# Patient Record
Sex: FEMALE | Race: WHITE | NOT HISPANIC OR LATINO | URBAN - METROPOLITAN AREA
[De-identification: names, ages, dates, MRNs, and addresses within clinical notes are randomized per-mention and may not be internally consistent; named-entity substitution may affect disease eponyms.]

---

## 2017-08-11 ENCOUNTER — OFFICE VISIT (OUTPATIENT)
Dept: CARDIOLOGY | Facility: HOSPITAL | Age: 52
End: 2017-08-11

## 2017-08-11 ENCOUNTER — PROCEDURE VISIT (OUTPATIENT)
Dept: CARDIOLOGY | Facility: HOSPITAL | Age: 52
End: 2017-08-11

## 2017-08-11 VITALS
WEIGHT: 140.6 LBS | BODY MASS INDEX: 24.01 KG/M2 | TEMPERATURE: 98.1 F | HEIGHT: 64 IN | DIASTOLIC BLOOD PRESSURE: 93 MMHG | OXYGEN SATURATION: 97 % | SYSTOLIC BLOOD PRESSURE: 125 MMHG | HEART RATE: 67 BPM | RESPIRATION RATE: 12 BRPM

## 2017-08-11 DIAGNOSIS — R06.09 DOE (DYSPNEA ON EXERTION): ICD-10-CM

## 2017-08-11 DIAGNOSIS — R07.9 CHEST PAIN, UNSPECIFIED: Primary | ICD-10-CM

## 2017-08-11 DIAGNOSIS — J43.2 CENTRILOBULAR EMPHYSEMA (HCC): ICD-10-CM

## 2017-08-11 DIAGNOSIS — I47.1 PSVT (PAROXYSMAL SUPRAVENTRICULAR TACHYCARDIA) (HCC): ICD-10-CM

## 2017-08-11 DIAGNOSIS — R07.9 CHEST PAIN, UNSPECIFIED: ICD-10-CM

## 2017-08-11 DIAGNOSIS — R00.2 PALPITATIONS: ICD-10-CM

## 2017-08-11 PROCEDURE — 93005 ELECTROCARDIOGRAM TRACING: CPT

## 2017-08-11 PROCEDURE — 99214 OFFICE O/P EST MOD 30 MIN: CPT | Performed by: NURSE PRACTITIONER

## 2017-08-11 RX ORDER — ESTRADIOL 2 MG/1
2 TABLET ORAL DAILY
Refills: 1 | COMMUNITY
Start: 2017-07-25

## 2017-08-11 RX ORDER — ERGOCALCIFEROL 1.25 MG/1
50000 CAPSULE ORAL 2 TIMES WEEKLY
Refills: 0 | COMMUNITY
Start: 2017-07-25

## 2017-08-11 RX ORDER — TIOTROPIUM BROMIDE INHALATION SPRAY 3.12 UG/1
1 SPRAY, METERED RESPIRATORY (INHALATION) DAILY
Refills: 0 | COMMUNITY
Start: 2017-06-16 | End: 2022-11-10

## 2017-08-11 RX ORDER — LEVOTHYROXINE SODIUM 0.12 MG/1
125 TABLET ORAL DAILY
Refills: 0 | COMMUNITY
Start: 2017-08-05

## 2017-08-11 RX ORDER — METHOCARBAMOL 500 MG/1
500 TABLET, FILM COATED ORAL AS NEEDED
Refills: 0 | COMMUNITY
Start: 2017-06-10

## 2017-08-11 RX ORDER — BUPRENORPHINE 10 UG/H
1 PATCH, EXTENDED RELEASE TRANSDERMAL WEEKLY
Refills: 0 | COMMUNITY
Start: 2017-07-21 | End: 2022-11-10

## 2017-08-11 RX ORDER — LANOLIN ALCOHOL/MO/W.PET/CERES
1000 CREAM (GRAM) TOPICAL DAILY
COMMUNITY
End: 2017-08-28

## 2017-08-11 RX ORDER — BISOPROLOL FUMARATE 5 MG/1
5 TABLET, FILM COATED ORAL DAILY
Qty: 30 TABLET | Refills: 1 | Status: SHIPPED | OUTPATIENT
Start: 2017-08-11 | End: 2017-08-28

## 2017-08-11 RX ORDER — CITALOPRAM 40 MG/1
40 TABLET ORAL DAILY
Refills: 0 | COMMUNITY
Start: 2017-08-05

## 2017-08-11 RX ORDER — OMEPRAZOLE 20 MG/1
20 CAPSULE, DELAYED RELEASE ORAL DAILY
Refills: 0 | COMMUNITY
Start: 2017-08-05 | End: 2022-11-10

## 2017-08-11 RX ORDER — ASPIRIN 81 MG/1
81 TABLET ORAL DAILY
COMMUNITY
End: 2022-11-10

## 2017-08-11 RX ORDER — ONDANSETRON 4 MG/1
4 TABLET, FILM COATED ORAL EVERY 6 HOURS PRN
Refills: 0 | COMMUNITY
Start: 2017-07-25 | End: 2022-11-10 | Stop reason: SDUPTHER

## 2017-08-11 RX ORDER — COLCHICINE 0.6 MG/1
0.6 CAPSULE ORAL DAILY
Refills: 2 | COMMUNITY
Start: 2017-07-07

## 2017-08-11 RX ORDER — TIZANIDINE 4 MG/1
2 TABLET ORAL AS NEEDED
Refills: 0 | COMMUNITY
Start: 2017-07-28

## 2017-08-11 NOTE — PROGRESS NOTES
Subjective:     Encounter Date:08/11/2017      Patient ID: Erlinda Steven is a 52 y.o. female.    Chief Complaint:  History of Present Illness: Mrs. Steven [facility] today at the request of PCP, Terrie WHITTINGTON.  She has a history of MI 2015 (cath report obtained from [place]).      Patient reports she began to have fast heart rate and chest pain again on 8/3/17.  She presented to the ER in [place], [place].  SVT was noted per EKG (see copy).  Converted back to NSR with Adenosine.  SVT occurred again on 8/5/17 and 8/8/17.  All episodes occurred while sleeping and symptoms awakened her.  Also, she has hx of pericarditis 2016, but current symptoms do not feel the same. She takes daily colchicine 0.6 mg for recurrent pericarditis x 3 episodes.  Finally, patient adds that she tolerates any higher doses of beta blocker poorly (severe fatigue).  She also has followed with Pulmonology in the past regarding pulmonary hypertension and COPD and PRINCE presently is more limiting than it was 6-12 months ago.     Past Medical History:   Diagnosis Date   • MI, old 2015    Cath @ [facility]/ non-obstructive CAD.     • Morbid obesity 2013    S/P gastric bypass   • Pericarditis 2016   • PSVT (paroxysmal supraventricular tachycardia) 08/2017       Past Surgical History:   Procedure Laterality Date   • BREAST LUMPECTOMY  2010   • CARDIAC CATHETERIZATION  2015   • CYSTECTOMY  1984    From left wrist   • GASTRIC BYPASS  2013   • HYSTERECTOMY  2010       Social History     Social History   • Marital status:      Spouse name: N/A   • Number of children: N/A   • Years of education: N/A     Occupational History   • Not on file.     Social History Main Topics   • Smoking status: Former Smoker     Packs/day: 1.00     Years: 30.00     Quit date: 8/11/2009   • Smokeless tobacco: Never Used   • Alcohol use Yes      Comment: occasional   • Drug use: No   • Sexual activity: Not on file     Other  Topics Concern   • Not on file     Social History Narrative    Patient consumes 2 serving of caffeine daily.     Patient lives at home with .            Family History   Problem Relation Age of Onset   • Heart attack Mother    • Hypertension Father        Review of Systems   Constitution: Positive for weakness and malaise/fatigue. Negative for chills, decreased appetite, diaphoresis, fever, night sweats, weight gain and weight loss.   HENT: Negative for congestion, headaches, hearing loss, hoarse voice and nosebleeds.    Eyes: Negative for blurred vision, visual disturbance and visual halos.   Cardiovascular: Positive for chest pain, claudication, dyspnea on exertion, irregular heartbeat and palpitations. Negative for cyanosis, leg swelling, near-syncope, orthopnea, paroxysmal nocturnal dyspnea and syncope.   Respiratory: Positive for shortness of breath. Negative for cough, hemoptysis, sleep disturbances due to breathing, snoring, sputum production and wheezing.    Hematologic/Lymphatic: Negative for bleeding problem. Bruises/bleeds easily.   Skin: Negative for dry skin, itching and rash.   Musculoskeletal: Positive for joint pain and muscle weakness. Negative for arthritis, joint swelling and myalgias.   Gastrointestinal: Negative for bloating, abdominal pain, constipation, diarrhea, flatus, heartburn, hematemesis, hematochezia, melena, nausea and vomiting.   Genitourinary: Negative for dysuria, frequency, hematuria, nocturia and urgency.   Neurological: Positive for excessive daytime sleepiness. Negative for dizziness, light-headedness and loss of balance.   Psychiatric/Behavioral: Negative for depression. The patient has insomnia. The patient is not nervous/anxious.          Objective:     Physical Exam   Constitutional: She is oriented to person, place, and time. She appears well-developed and well-nourished. No distress.   HENT:   Head: Normocephalic.   Eyes: Pupils are equal, round, and reactive to  "light.   Neck: Neck supple. No JVD present.   Cardiovascular: Normal rate, regular rhythm and intact distal pulses.    No murmur heard.  Split S1 S2   Pulmonary/Chest: Effort normal and breath sounds normal. No respiratory distress. She has no wheezes. She has no rales. She exhibits no tenderness.   Abdominal: Soft. Bowel sounds are normal. There is no tenderness.   Musculoskeletal: Normal range of motion. She exhibits no edema.   Neurological: She is alert and oriented to person, place, and time. No cranial nerve deficit.   Skin: Skin is warm and dry. No rash noted.   Psychiatric: She has a normal mood and affect. Her behavior is normal.     Vitals:    08/11/17 1356 08/11/17 1358 08/11/17 1400   BP: 131/82 128/77 125/93   BP Location: Right arm Left arm Left arm   Patient Position: Sitting Sitting Standing   Pulse: 67  67   Resp: 12     Temp: 98.1 °F (36.7 °C)     TempSrc: Temporal Artery      SpO2: 97%     Weight: 140 lb 9.6 oz (63.8 kg)     Height: 64\" (162.6 cm)         Lab Review: Records sent from PCP, [facility], old records from [facility]     Assessment/ Plan:         Diagnosis Plan   1. Chest pain/ worsened PRINCE associated with new onset PSVT.  Hx of MI.   This requires new ischemic evaluation. Patient states she is unable to walk the treadmill due exercise limiting PRINCE/ COPD.      Check echocardiogram to evaluate LV function and evaluate for valvular disease.  Patient states she would like to establish with [facility] rather than re-visit w/ the [facility].  Will arrange clinic visit after above diagnostics. Return to [facility] in one month to evaluate tolerability of medication changes/ new symptoms.     For PSVT, trial of Bisoprolol 5 mg daily as she tolerates metoprolol poorly.         2. Centrilobular emphysema/ COPD Previously followed with Dr. Fernando.  Will request follow up with Dr. Krishna.  Also consider REJI as a cause for PSVT as all events have occurred while " patient sleeping.     3       Hx of recurrent pericarditis Check echocardiogram.     4.      Hx of morbid obesity S/p gastric bypass procedure.  Patient has had follow up with Endocrinology as of 7/21/17 r/t GI malabsorption.

## 2017-08-28 ENCOUNTER — CONSULT (OUTPATIENT)
Dept: CARDIOLOGY | Facility: CLINIC | Age: 52
End: 2017-08-28

## 2017-08-28 VITALS
BODY MASS INDEX: 23.7 KG/M2 | HEIGHT: 64 IN | SYSTOLIC BLOOD PRESSURE: 118 MMHG | HEART RATE: 64 BPM | DIASTOLIC BLOOD PRESSURE: 84 MMHG | WEIGHT: 138.8 LBS

## 2017-08-28 DIAGNOSIS — I31.9 CHRONIC IDIOPATHIC PERICARDITIS, UNSPECIFIED COMPLICATION STATUS: ICD-10-CM

## 2017-08-28 DIAGNOSIS — I47.1 PSVT (PAROXYSMAL SUPRAVENTRICULAR TACHYCARDIA) (HCC): Primary | ICD-10-CM

## 2017-08-28 PROCEDURE — 93000 ELECTROCARDIOGRAM COMPLETE: CPT | Performed by: INTERNAL MEDICINE

## 2017-08-28 PROCEDURE — 99244 OFF/OP CNSLTJ NEW/EST MOD 40: CPT | Performed by: INTERNAL MEDICINE

## 2017-08-28 NOTE — PROGRESS NOTES
Erlinda Steven  1965  [phone]  [phone]    08/28/2017    [facility]    Terrie Pinedo, APRN  [address] [address]    REFERRING DOCTOR : Dr Franck Ko        Patient ID: Erlinda Steven is a 52 y.o. female.    Chief Complaint:   Chief Complaint   Patient presents with   • SVT     Consult   • Chest Pain   • Irregular Heart Beat   • Dizziness     Patient Active Problem List   Diagnosis   • Chest pain, unspecified   • Centrilobular emphysema   • PRINCE (dyspnea on exertion)   • Palpitations   • PSVT (paroxysmal supraventricular tachycardia)   • Arthritis   • Asthma   • Hypothyroid   • Chronic idiopathic pericarditis         Allergies   Allergen Reactions   • Penicillins      Nausea and vomiting   • Sulfa Antibiotics      Nausea and vomiting       Current Outpatient Prescriptions:   •  aspirin 81 MG EC tablet, Take 81 mg by mouth Daily., Disp: , Rfl:   •  BUTRANS 10 MCG/HR patch weekly, Place 1 patch on the skin 1 (One) Time Per Week., Disp: , Rfl: 0  •  citalopram (CeleXA) 40 MG tablet, Take 40 mg by mouth Daily., Disp: , Rfl: 0  •  colchicine 0.6 MG capsule capsule, Take 0.6 mg by mouth Daily., Disp: , Rfl: 2  •  estradiol (ESTRACE) 2 MG tablet, Take 2 mg by mouth Daily., Disp: , Rfl: 1  •  levothyroxine (SYNTHROID, LEVOTHROID) 125 MCG tablet, Take 125 mcg by mouth Daily., Disp: , Rfl: 0  •  methocarbamol (ROBAXIN) 500 MG tablet, Take 500 mg by mouth As Needed., Disp: , Rfl: 0  •  omeprazole (priLOSEC) 20 MG capsule, Take 20 mg by mouth Daily., Disp: , Rfl: 0  •  ondansetron (ZOFRAN) 4 MG tablet, Take 4 mg by mouth Every 6 (Six) Hours As Needed., Disp: , Rfl: 0  •  SPIRIVA RESPIMAT 2.5 MCG/ACT aerosol solution, Inhale 1 puff Daily., Disp: , Rfl: 0  •  tiZANidine (ZANAFLEX) 4 MG tablet, Take 2 mg by mouth Every Night., Disp: , Rfl: 0  •  vitamin D (ERGOCALCIFEROL) 23411 UNITS capsule capsule, Take 50,000 Units by mouth 2 (Two) Times a Week., Disp: ,  Rfl: 0    History of Present Illness  Patient presents today for consultation referred by Franck Ko regarding recurrent SVT.  Has had a history of previous pericarditis in the past currently on colchicine.  She's had at least 3 or 4 episodes of this with no actual etiology found.  She states she has had palpitations sporadic for most of her life however now for the past one to 2 years having them more often.  Seems to be having them now on a daily basis.  She went to the emergency room at the beginning of August due to chest pain and palpitations.  At that time found to be in SVT at 150 bpm.  She states that when she goes into these rhythms she tries to bear down multiple times and this works only occasionally.  Sometimes just with time they go away.  She states that when she has these she gets short of breath palpitations as well as some dizziness at times.     The following portions of the patient's history were reviewed and updated as appropriate: allergies, current medications, past family history, past medical history, past social history, past surgical history and problem list.    Past Medical History:   Diagnosis Date   • Acid reflux    • Arthritis    • Asthma     HX of   • Chest pain     2/2acute pericarditis ( hx of recurrent episode since late teens) diffuse, non specific EKG, no troponin evaluation to suggest myopericarditis has been treated in the past with prednisone but this may encourage recurrence   • Chicken pox    • COPD (chronic obstructive pulmonary disease)    • Hypothyroid    • Measles    • Menopause    • MI, old 2015    Cath @ [facility]/ non-obstructive CAD.     • Morbid obesity 2013    S/P gastric bypass   • Nausea    • Pericarditis 2016   • PSVT (paroxysmal supraventricular tachycardia) 08/2017   • Scarlet fever    • Ulcer          Past Surgical History:   Procedure Laterality Date   • BREAST LUMPECTOMY Bilateral 2010   • CARDIAC CATHETERIZATION  2015   • CHOLECYSTECTOMY     •  CYSTECTOMY  1984    From left wrist   • GASTRIC BYPASS  2013   • HYSTERECTOMY  2010    partial   • OTHER SURGICAL HISTORY      Left ganglion cyst   • OTHER SURGICAL HISTORY      Plate and pin in left radial head   • TUBAL ABDOMINAL LIGATION         Social History     Social History   • Marital status:      Spouse name: N/A   • Number of children: N/A   • Years of education: N/A     Occupational History   • Not on file.     Social History Main Topics   • Smoking status: Former Smoker     Packs/day: 1.00     Years: 30.00     Quit date: 8/11/2009   • Smokeless tobacco: Never Used   • Alcohol use Yes      Comment: occasional   • Drug use: No   • Sexual activity: Defer     Other Topics Concern   • Not on file     Social History Narrative    Patient consumes 2 serving of caffeine daily.     Patient lives at home with .            Family History   Problem Relation Age of Onset   • Heart attack Mother    • Arthritis Mother    • Hypertension Mother    • Obesity Mother    • Migraines Mother    • Hypertension Father    • Diabetes Father    • Stroke Father    • Obesity Father    • Cancer Paternal Grandmother      Colon         REVIEW OF SYSTEMS:   CONSTITUTIONAL: No weight loss, fever, chills  HEENT: Eyes: No visual loss, blurred vision, double vision or yellow sclerae. Ears, Nose, Throat: No hearing loss, sneezing, congestion, runny nose or sore throat.   SKIN: No rash or itching.     RESPIRATORY: No  hemoptysis, cough or sputum.   GASTROINTESTINAL: No anorexia, nausea, vomiting or diarrhea. No abdominal pain, bright red blood per rectum or melena.  GENITOURINARY: No burning on urination, hematuria or increased frequency.  NEUROLOGICAL: No headache, dizziness, syncope, paralysis, ataxia, numbness or tingling in the extremities. No change in bowel or bladder control.   MUSCULOSKELETAL: No muscle, back pain, joint pain or stiffness.   HEMATOLOGIC: No anemia, bleeding or bruising.   LYMPHATICS: No enlarged nodes. No  "history of splenectomy.   PSYCHIATRIC: No history of depression, anxiety, hallucinations.   ENDOCRINOLOGIC: No reports of sweating, cold or heat intolerance. No polyuria or polydipsia.   Ext: No edema or bruising      The patient's old records including ambulatory rhythm recordings (ECGs, Holter/event monitor) were reviewed and discussed.           Objective:       Vitals:    08/28/17 0833   BP: 118/84   BP Location: Left arm   Patient Position: Sitting   Pulse: 64   Weight: 138 lb 12.8 oz (63 kg)   Height: 64\" (162.6 cm)       Constitutional: oriented to person, place, and time.  well-developed and well-nourished. No distress.   HENT: Normocephalic.   Eyes: Conjunctivae are normal. No scleral icterus.   Neck: Normal carotid pulses, no hepatojugular reflux and no JVD present. Carotid bruit is not present. No tracheal deviation, no edema and no erythema present. No thyromegaly present.   Cardiovascular: Normal rate, regular rhythm, S1 normal, S2 normal, normal heart sounds and intact distal pulses.   No extrasystoles are present. PMI is not displaced.  Exam reveals no gallop, no distant heart sounds and no friction rub.    No murmur heard.  Pulses:       Radial pulses are 2+ on the right side, and 2+ on the left side.       Dorsalis pedis pulses are 2+ on the right side, and 2+ on the left side.   Pulmonary/Chest: Effort normal and breath sounds normal. No respiratory distress. She has no decreased breath sounds.  no wheezes,  Rhonchi or rales.  no tenderness.   Abdominal: Soft. Bowel sounds are normal. She exhibits no distension and no mass. There is no hepatosplenomegaly. There is no tenderness. There is no rebound and no guarding.   Musculoskeletal:  exhibits no edema, tenderness or deformity.   Neurological: is alert and oriented to person, place, and time.   Skin: Skin is warm and dry. No rash noted. No diaphoretic. No cyanosis or erythema. No pallor. Nails show no clubbing.   Psychiatric: Normal mood and " affect.Speech is normal and behavior is normal.    Lab Review:   Results for orders placed or performed during the hospital encounter of 09/22/16   Troponin   Result Value Ref Range    Troponin I <0.01 0.00 - 0.05 ng/mL   C-reactive Protein   Result Value Ref Range    C-Reactive Protein <0.5 0.0 - 1.0 mg/dL   Sedimentation Rate   Result Value Ref Range    Sed Rate 2 0 - 20 mm/hr         ECG 12 Lead  Date/Time: 8/28/2017 9:21 AM  Performed by: CARRI SHANKS  Authorized by: CARRI SHANKS   Rhythm: sinus bradycardia  Comments: HR 59 bpm. Short NM with minimal to no preexcitation.                 Diagnosis:   1. PSVT (paroxysmal supraventricular tachycardia)  2. Chronic idiopathic pericarditis, on colchicine      Assessment & Plan:   1. Recurrent symptomatic SVT despite the use of medications.  Now having them on a daily basis.  Seems to be more of a long RP tachycardia.  EKG today showed short NM interval with minimal to no preexcitation noted on EKG.  I think she would be best served with an EP study plus or minus ablation.  She understands all risk and benefits and wished to proceed. For now will only take when necessary metoprolol if has recurrence of SVT and will continue to bear down as needed.  2.  Chronic idiopathic pericarditis maintained on colchicine.  Currently controlled.  3. Follow up after ablation         CC: Dr Franck Shanks, DO  08/28/17  9:22 AM      EMR Dragon/Transcription disclaimer:  Much of this encounter note is an electronic transcription/translation of spoken language to printed text. Electronic translation of spoken language may permit erroneous, or at times, nonsensical words or phrases to be inadvertently transcribed. Although I have reviewed the note for such errors, some may still exist.

## 2017-09-05 ENCOUNTER — PREP FOR SURGERY (OUTPATIENT)
Dept: OTHER | Facility: HOSPITAL | Age: 52
End: 2017-09-05

## 2017-09-05 DIAGNOSIS — I47.1 SVT (SUPRAVENTRICULAR TACHYCARDIA) (HCC): Primary | ICD-10-CM

## 2017-09-05 RX ORDER — ACETAMINOPHEN 325 MG/1
650 TABLET ORAL EVERY 4 HOURS PRN
Status: CANCELLED | OUTPATIENT
Start: 2017-09-05

## 2017-09-05 RX ORDER — ONDANSETRON 2 MG/ML
4 INJECTION INTRAMUSCULAR; INTRAVENOUS EVERY 6 HOURS PRN
Status: CANCELLED | OUTPATIENT
Start: 2017-09-05

## 2017-09-05 RX ORDER — SODIUM CHLORIDE 0.9 % (FLUSH) 0.9 %
1-10 SYRINGE (ML) INJECTION AS NEEDED
Status: CANCELLED | OUTPATIENT
Start: 2017-09-05

## 2017-09-06 ENCOUNTER — PREP FOR SURGERY (OUTPATIENT)
Dept: OTHER | Facility: HOSPITAL | Age: 52
End: 2017-09-06

## 2017-09-06 ENCOUNTER — APPOINTMENT (OUTPATIENT)
Dept: PREADMISSION TESTING | Facility: HOSPITAL | Age: 52
End: 2017-09-06

## 2017-09-06 DIAGNOSIS — I47.1 SVT (SUPRAVENTRICULAR TACHYCARDIA) (HCC): ICD-10-CM

## 2017-09-06 LAB
ALBUMIN SERPL-MCNC: 4.3 G/DL (ref 3.2–4.8)
ALBUMIN/GLOB SERPL: 1.8 G/DL (ref 1.5–2.5)
ALP SERPL-CCNC: 100 U/L (ref 25–100)
ALT SERPL W P-5'-P-CCNC: 24 U/L (ref 7–40)
ANION GAP SERPL CALCULATED.3IONS-SCNC: 5 MMOL/L (ref 3–11)
AST SERPL-CCNC: 28 U/L (ref 0–33)
BILIRUB SERPL-MCNC: 0.2 MG/DL (ref 0.3–1.2)
BUN BLD-MCNC: 8 MG/DL (ref 9–23)
BUN/CREAT SERPL: 13.3 (ref 7–25)
CALCIUM SPEC-SCNC: 9 MG/DL (ref 8.7–10.4)
CHLORIDE SERPL-SCNC: 105 MMOL/L (ref 99–109)
CO2 SERPL-SCNC: 27 MMOL/L (ref 20–31)
CREAT BLD-MCNC: 0.6 MG/DL (ref 0.6–1.3)
DEPRECATED RDW RBC AUTO: 40.4 FL (ref 37–54)
ERYTHROCYTE [DISTWIDTH] IN BLOOD BY AUTOMATED COUNT: 14.4 % (ref 11.3–14.5)
GFR SERPL CREATININE-BSD FRML MDRD: 105 ML/MIN/1.73
GLOBULIN UR ELPH-MCNC: 2.4 GM/DL
GLUCOSE BLD-MCNC: 103 MG/DL (ref 70–100)
HCT VFR BLD AUTO: 34.8 % (ref 34.5–44)
HGB BLD-MCNC: 10.6 G/DL (ref 11.5–15.5)
INR PPP: 0.99
MAGNESIUM SERPL-MCNC: 2 MG/DL (ref 1.3–2.7)
MCH RBC QN AUTO: 23.2 PG (ref 27–31)
MCHC RBC AUTO-ENTMCNC: 30.5 G/DL (ref 32–36)
MCV RBC AUTO: 76.1 FL (ref 80–99)
PLATELET # BLD AUTO: 192 10*3/MM3 (ref 150–450)
PMV BLD AUTO: 9.9 FL (ref 6–12)
POTASSIUM BLD-SCNC: 3.7 MMOL/L (ref 3.5–5.5)
PROT SERPL-MCNC: 6.7 G/DL (ref 5.7–8.2)
PROTHROMBIN TIME: 10.8 SECONDS (ref 9.6–11.5)
RBC # BLD AUTO: 4.57 10*6/MM3 (ref 3.89–5.14)
SODIUM BLD-SCNC: 137 MMOL/L (ref 132–146)
WBC NRBC COR # BLD: 4.31 10*3/MM3 (ref 3.5–10.8)

## 2017-09-07 ENCOUNTER — HOSPITAL ENCOUNTER (OUTPATIENT)
Facility: HOSPITAL | Age: 52
Setting detail: OBSERVATION
Discharge: HOME | End: 2017-09-08
Attending: INTERNAL MEDICINE | Admitting: INTERNAL MEDICINE

## 2017-09-07 PROBLEM — I47.1 SVT (SUPRAVENTRICULAR TACHYCARDIA): Status: ACTIVE | Noted: 2017-09-07

## 2017-09-07 PROCEDURE — C1894 INTRO/SHEATH, NON-LASER: HCPCS | Performed by: INTERNAL MEDICINE

## 2017-09-07 PROCEDURE — 99152 MOD SED SAME PHYS/QHP 5/>YRS: CPT | Performed by: INTERNAL MEDICINE

## 2017-09-07 PROCEDURE — 25010000002 FENTANYL CITRATE (PF) 100 MCG/2ML SOLUTION: Performed by: INTERNAL MEDICINE

## 2017-09-07 PROCEDURE — 93613 INTRACARDIAC EPHYS 3D MAPG: CPT | Performed by: INTERNAL MEDICINE

## 2017-09-07 PROCEDURE — 93653 COMPRE EP EVAL TX SVT: CPT | Performed by: INTERNAL MEDICINE

## 2017-09-07 PROCEDURE — C1730 CATH, EP, 19 OR FEW ELECT: HCPCS | Performed by: INTERNAL MEDICINE

## 2017-09-07 PROCEDURE — G0378 HOSPITAL OBSERVATION PER HR: HCPCS

## 2017-09-07 PROCEDURE — 93623 PRGRMD STIMJ&PACG IV RX NFS: CPT | Performed by: INTERNAL MEDICINE

## 2017-09-07 PROCEDURE — C1732 CATH, EP, DIAG/ABL, 3D/VECT: HCPCS | Performed by: INTERNAL MEDICINE

## 2017-09-07 PROCEDURE — 25010000002 MIDAZOLAM PER 1 MG: Performed by: INTERNAL MEDICINE

## 2017-09-07 PROCEDURE — 25010000002 ONDANSETRON PER 1 MG: Performed by: INTERNAL MEDICINE

## 2017-09-07 RX ORDER — LEVOTHYROXINE SODIUM 0.12 MG/1
125 TABLET ORAL
Status: DISCONTINUED | OUTPATIENT
Start: 2017-09-07 | End: 2017-09-08 | Stop reason: HOSPADM

## 2017-09-07 RX ORDER — ASPIRIN 81 MG/1
81 TABLET ORAL DAILY
Status: DISCONTINUED | OUTPATIENT
Start: 2017-09-07 | End: 2017-09-08 | Stop reason: HOSPADM

## 2017-09-07 RX ORDER — FENTANYL CITRATE 50 UG/ML
INJECTION, SOLUTION INTRAMUSCULAR; INTRAVENOUS AS NEEDED
Status: DISCONTINUED | OUTPATIENT
Start: 2017-09-07 | End: 2017-09-07 | Stop reason: HOSPADM

## 2017-09-07 RX ORDER — CITALOPRAM 40 MG/1
40 TABLET ORAL DAILY
Status: DISCONTINUED | OUTPATIENT
Start: 2017-09-07 | End: 2017-09-08 | Stop reason: HOSPADM

## 2017-09-07 RX ORDER — OXYCODONE HYDROCHLORIDE AND ACETAMINOPHEN 5; 325 MG/1; MG/1
1 TABLET ORAL EVERY 4 HOURS PRN
Status: DISCONTINUED | OUTPATIENT
Start: 2017-09-07 | End: 2017-09-08 | Stop reason: HOSPADM

## 2017-09-07 RX ORDER — MIDAZOLAM HYDROCHLORIDE 1 MG/ML
INJECTION INTRAMUSCULAR; INTRAVENOUS AS NEEDED
Status: DISCONTINUED | OUTPATIENT
Start: 2017-09-07 | End: 2017-09-07 | Stop reason: HOSPADM

## 2017-09-07 RX ORDER — ONDANSETRON 2 MG/ML
4 INJECTION INTRAMUSCULAR; INTRAVENOUS EVERY 6 HOURS PRN
Status: DISCONTINUED | OUTPATIENT
Start: 2017-09-07 | End: 2017-09-08 | Stop reason: HOSPADM

## 2017-09-07 RX ORDER — TIZANIDINE 4 MG/1
2 TABLET ORAL NIGHTLY
Status: DISCONTINUED | OUTPATIENT
Start: 2017-09-07 | End: 2017-09-08 | Stop reason: HOSPADM

## 2017-09-07 RX ORDER — ONDANSETRON 2 MG/ML
INJECTION INTRAMUSCULAR; INTRAVENOUS AS NEEDED
Status: DISCONTINUED | OUTPATIENT
Start: 2017-09-07 | End: 2017-09-07 | Stop reason: HOSPADM

## 2017-09-07 RX ORDER — COLCHICINE 0.6 MG/1
0.6 TABLET ORAL NIGHTLY
Status: DISCONTINUED | OUTPATIENT
Start: 2017-09-07 | End: 2017-09-08 | Stop reason: HOSPADM

## 2017-09-07 RX ORDER — PANTOPRAZOLE SODIUM 40 MG/1
40 TABLET, DELAYED RELEASE ORAL EVERY MORNING
Status: DISCONTINUED | OUTPATIENT
Start: 2017-09-07 | End: 2017-09-08 | Stop reason: HOSPADM

## 2017-09-07 RX ORDER — ONDANSETRON 2 MG/ML
4 INJECTION INTRAMUSCULAR; INTRAVENOUS EVERY 6 HOURS PRN
Status: DISCONTINUED | OUTPATIENT
Start: 2017-09-07 | End: 2017-09-07 | Stop reason: HOSPADM

## 2017-09-07 RX ORDER — SODIUM CHLORIDE 0.9 % (FLUSH) 0.9 %
1-10 SYRINGE (ML) INJECTION AS NEEDED
Status: DISCONTINUED | OUTPATIENT
Start: 2017-09-07 | End: 2017-09-07 | Stop reason: HOSPADM

## 2017-09-07 RX ORDER — ACETAMINOPHEN 325 MG/1
650 TABLET ORAL EVERY 4 HOURS PRN
Status: DISCONTINUED | OUTPATIENT
Start: 2017-09-07 | End: 2017-09-07 | Stop reason: HOSPADM

## 2017-09-07 RX ORDER — ALPRAZOLAM 0.5 MG/1
0.5 TABLET ORAL ONCE
Status: COMPLETED | OUTPATIENT
Start: 2017-09-07 | End: 2017-09-07

## 2017-09-07 RX ADMIN — PANTOPRAZOLE SODIUM 40 MG: 40 TABLET, DELAYED RELEASE ORAL at 16:52

## 2017-09-07 RX ADMIN — ALPRAZOLAM 0.5 MG: 0.5 TABLET ORAL at 16:52

## 2017-09-07 RX ADMIN — ASPIRIN 81 MG: 81 TABLET, COATED ORAL at 16:52

## 2017-09-07 RX ADMIN — COLCHICINE 0.6 MG: 0.6 TABLET, FILM COATED ORAL at 21:56

## 2017-09-07 RX ADMIN — TIZANIDINE 2 MG: 4 TABLET ORAL at 21:56

## 2017-09-07 NOTE — H&P (VIEW-ONLY)
Erlinda Steven  1965  [phone]  [phone]    08/28/2017    [facility] CARDIOLOGY    Terrie Pinedo, APRN  [address] [facility] [address]    REFERRING DOCTOR : Dr Franck Ko        Patient ID: Erlinda Steven is a 52 y.o. female.    Chief Complaint:   Chief Complaint   Patient presents with   • SVT     Consult   • Chest Pain   • Irregular Heart Beat   • Dizziness     Patient Active Problem List   Diagnosis   • Chest pain, unspecified   • Centrilobular emphysema   • PRINCE (dyspnea on exertion)   • Palpitations   • PSVT (paroxysmal supraventricular tachycardia)   • Arthritis   • Asthma   • Hypothyroid   • Chronic idiopathic pericarditis         Allergies   Allergen Reactions   • Penicillins      Nausea and vomiting   • Sulfa Antibiotics      Nausea and vomiting       Current Outpatient Prescriptions:   •  aspirin 81 MG EC tablet, Take 81 mg by mouth Daily., Disp: , Rfl:   •  BUTRANS 10 MCG/HR patch weekly, Place 1 patch on the skin 1 (One) Time Per Week., Disp: , Rfl: 0  •  citalopram (CeleXA) 40 MG tablet, Take 40 mg by mouth Daily., Disp: , Rfl: 0  •  colchicine 0.6 MG capsule capsule, Take 0.6 mg by mouth Daily., Disp: , Rfl: 2  •  estradiol (ESTRACE) 2 MG tablet, Take 2 mg by mouth Daily., Disp: , Rfl: 1  •  levothyroxine (SYNTHROID, LEVOTHROID) 125 MCG tablet, Take 125 mcg by mouth Daily., Disp: , Rfl: 0  •  methocarbamol (ROBAXIN) 500 MG tablet, Take 500 mg by mouth As Needed., Disp: , Rfl: 0  •  omeprazole (priLOSEC) 20 MG capsule, Take 20 mg by mouth Daily., Disp: , Rfl: 0  •  ondansetron (ZOFRAN) 4 MG tablet, Take 4 mg by mouth Every 6 (Six) Hours As Needed., Disp: , Rfl: 0  •  SPIRIVA RESPIMAT 2.5 MCG/ACT aerosol solution, Inhale 1 puff Daily., Disp: , Rfl: 0  •  tiZANidine (ZANAFLEX) 4 MG tablet, Take 2 mg by mouth Every Night., Disp: , Rfl: 0  •  vitamin D (ERGOCALCIFEROL) 68045 UNITS capsule capsule, Take 50,000 Units by mouth 2 (Two) Times a Week., Disp: ,  Rfl: 0    History of Present Illness  Patient presents today for consultation referred by Franck Ko regarding recurrent SVT.  Has had a history of previous pericarditis in the past currently on colchicine.  She's had at least 3 or 4 episodes of this with no actual etiology found.  She states she has had palpitations sporadic for most of her life however now for the past one to 2 years having them more often.  Seems to be having them now on a daily basis.  She went to the emergency room at the beginning of August due to chest pain and palpitations.  At that time found to be in SVT at 150 bpm.  She states that when she goes into these rhythms she tries to bear down multiple times and this works only occasionally.  Sometimes just with time they go away.  She states that when she has these she gets short of breath palpitations as well as some dizziness at times.     The following portions of the patient's history were reviewed and updated as appropriate: allergies, current medications, past family history, past medical history, past social history, past surgical history and problem list.    Past Medical History:   Diagnosis Date   • Acid reflux    • Arthritis    • Asthma     HX of   • Chest pain     2/2acute pericarditis ( hx of recurrent episode since late teens) diffuse, non specific EKG, no troponin evaluation to suggest myopericarditis has been treated in the past with prednisone but this may encourage recurrence   • Chicken pox    • COPD (chronic obstructive pulmonary disease)    • Hypothyroid    • Measles    • Menopause    • MI, old 2015    Cath @ [facility]/ non-obstructive CAD.     • Morbid obesity 2013    S/P gastric bypass   • Nausea    • Pericarditis 2016   • PSVT (paroxysmal supraventricular tachycardia) 08/2017   • Scarlet fever    • Ulcer          Past Surgical History:   Procedure Laterality Date   • BREAST LUMPECTOMY Bilateral 2010   • CARDIAC CATHETERIZATION  2015   • CHOLECYSTECTOMY     •  CYSTECTOMY  1984    From left wrist   • GASTRIC BYPASS  2013   • HYSTERECTOMY  2010    partial   • OTHER SURGICAL HISTORY      Left ganglion cyst   • OTHER SURGICAL HISTORY      Plate and pin in left radial head   • TUBAL ABDOMINAL LIGATION         Social History     Social History   • Marital status:      Spouse name: N/A   • Number of children: N/A   • Years of education: N/A     Occupational History   • Not on file.     Social History Main Topics   • Smoking status: Former Smoker     Packs/day: 1.00     Years: 30.00     Quit date: 8/11/2009   • Smokeless tobacco: Never Used   • Alcohol use Yes      Comment: occasional   • Drug use: No   • Sexual activity: Defer     Other Topics Concern   • Not on file     Social History Narrative    Patient consumes 2 serving of caffeine daily.     Patient lives at home with .            Family History   Problem Relation Age of Onset   • Heart attack Mother    • Arthritis Mother    • Hypertension Mother    • Obesity Mother    • Migraines Mother    • Hypertension Father    • Diabetes Father    • Stroke Father    • Obesity Father    • Cancer Paternal Grandmother      Colon         REVIEW OF SYSTEMS:   CONSTITUTIONAL: No weight loss, fever, chills  HEENT: Eyes: No visual loss, blurred vision, double vision or yellow sclerae. Ears, Nose, Throat: No hearing loss, sneezing, congestion, runny nose or sore throat.   SKIN: No rash or itching.     RESPIRATORY: No  hemoptysis, cough or sputum.   GASTROINTESTINAL: No anorexia, nausea, vomiting or diarrhea. No abdominal pain, bright red blood per rectum or melena.  GENITOURINARY: No burning on urination, hematuria or increased frequency.  NEUROLOGICAL: No headache, dizziness, syncope, paralysis, ataxia, numbness or tingling in the extremities. No change in bowel or bladder control.   MUSCULOSKELETAL: No muscle, back pain, joint pain or stiffness.   HEMATOLOGIC: No anemia, bleeding or bruising.   LYMPHATICS: No enlarged nodes. No  "history of splenectomy.   PSYCHIATRIC: No history of depression, anxiety, hallucinations.   ENDOCRINOLOGIC: No reports of sweating, cold or heat intolerance. No polyuria or polydipsia.   Ext: No edema or bruising      The patient's old records including ambulatory rhythm recordings (ECGs, Holter/event monitor) were reviewed and discussed.           Objective:       Vitals:    08/28/17 0833   BP: 118/84   BP Location: Left arm   Patient Position: Sitting   Pulse: 64   Weight: 138 lb 12.8 oz (63 kg)   Height: 64\" (162.6 cm)       Constitutional: oriented to person, place, and time.  well-developed and well-nourished. No distress.   HENT: Normocephalic.   Eyes: Conjunctivae are normal. No scleral icterus.   Neck: Normal carotid pulses, no hepatojugular reflux and no JVD present. Carotid bruit is not present. No tracheal deviation, no edema and no erythema present. No thyromegaly present.   Cardiovascular: Normal rate, regular rhythm, S1 normal, S2 normal, normal heart sounds and intact distal pulses.   No extrasystoles are present. PMI is not displaced.  Exam reveals no gallop, no distant heart sounds and no friction rub.    No murmur heard.  Pulses:       Radial pulses are 2+ on the right side, and 2+ on the left side.       Dorsalis pedis pulses are 2+ on the right side, and 2+ on the left side.   Pulmonary/Chest: Effort normal and breath sounds normal. No respiratory distress. She has no decreased breath sounds.  no wheezes,  Rhonchi or rales.  no tenderness.   Abdominal: Soft. Bowel sounds are normal. She exhibits no distension and no mass. There is no hepatosplenomegaly. There is no tenderness. There is no rebound and no guarding.   Musculoskeletal:  exhibits no edema, tenderness or deformity.   Neurological: is alert and oriented to person, place, and time.   Skin: Skin is warm and dry. No rash noted. No diaphoretic. No cyanosis or erythema. No pallor. Nails show no clubbing.   Psychiatric: Normal mood and " affect.Speech is normal and behavior is normal.    Lab Review:   Results for orders placed or performed during the hospital encounter of 09/22/16   Troponin   Result Value Ref Range    Troponin I <0.01 0.00 - 0.05 ng/mL   C-reactive Protein   Result Value Ref Range    C-Reactive Protein <0.5 0.0 - 1.0 mg/dL   Sedimentation Rate   Result Value Ref Range    Sed Rate 2 0 - 20 mm/hr         ECG 12 Lead  Date/Time: 8/28/2017 9:21 AM  Performed by: CARRI SHANKS  Authorized by: CARRI SHANKS   Rhythm: sinus bradycardia  Comments: HR 59 bpm. Short MA with minimal to no preexcitation.                 Diagnosis:   1. PSVT (paroxysmal supraventricular tachycardia)  2. Chronic idiopathic pericarditis, on colchicine      Assessment & Plan:   1. Recurrent symptomatic SVT despite the use of medications.  Now having them on a daily basis.  Seems to be more of a long RP tachycardia.  EKG today showed short MA interval with minimal to no preexcitation noted on EKG.  I think she would be best served with an EP study plus or minus ablation.  She understands all risk and benefits and wished to proceed. For now will only take when necessary metoprolol if has recurrence of SVT and will continue to bear down as needed.  2.  Chronic idiopathic pericarditis maintained on colchicine.  Currently controlled.  3. Follow up after ablation         CC: Dr Franck Shanks, DO  08/28/17  9:22 AM      EMR Dragon/Transcription disclaimer:  Much of this encounter note is an electronic transcription/translation of spoken language to printed text. Electronic translation of spoken language may permit erroneous, or at times, nonsensical words or phrases to be inadvertently transcribed. Although I have reviewed the note for such errors, some may still exist.

## 2017-09-07 NOTE — INTERVAL H&P NOTE
"  H&P reviewed. The patient was examined and there are no changes to the H&P.    Vitals:    09/07/17 1020   BP: 140/74   BP Location: Left arm   Patient Position: Lying   Pulse: 71   Resp: 16   Temp: 97.5 °F (36.4 °C)   TempSrc: Temporal Artery    SpO2: 97%   Weight: 136 lb 3.9 oz (61.8 kg)   Height: 64\" (162.6 cm)     Results Review:     I reviewed the patient's new clinical results.      Results from last 7 days  Lab Units 09/06/17  1625   WBC 10*3/mm3 4.31   HEMOGLOBIN g/dL 10.6*   HEMATOCRIT % 34.8   PLATELETS 10*3/mm3 192       Results from last 7 days  Lab Units 09/06/17  1625   SODIUM mmol/L 137   POTASSIUM mmol/L 3.7   CHLORIDE mmol/L 105   CO2 mmol/L 27.0   BUN mg/dL 8*   CREATININE mg/dL 0.60   CALCIUM mg/dL 9.0   BILIRUBIN mg/dL 0.2*   ALK PHOS U/L 100   ALT (SGPT) U/L 24   AST (SGOT) U/L 28   GLUCOSE mg/dL 103*       Results from last 7 days  Lab Units 09/06/17  1625   SODIUM mmol/L 137   POTASSIUM mmol/L 3.7   CHLORIDE mmol/L 105   CO2 mmol/L 27.0   BUN mg/dL 8*   CREATININE mg/dL 0.60   GLUCOSE mg/dL 103*   CALCIUM mg/dL 9.0       Results from last 7 days  Lab Units 09/06/17  1625   INR  0.99     No results found for: TROPONINT                A/P     1. Symptomatic recurrent SVT   - EPS +/- RFA today. Risks, benefits, and alternatives have been discussed and patient wishes to proceed.     STAFF:  52 year old female with history of recurrent SVT despite medical therapy. Here for EP study +/- RFA. The risks, benefits, and alternatives of the procedure have been reviewed and the patient wishes to proceed.     Javier Orozco DO  5:33 PM  09/07/17    "

## 2017-09-08 VITALS
TEMPERATURE: 98.3 F | DIASTOLIC BLOOD PRESSURE: 63 MMHG | OXYGEN SATURATION: 91 % | WEIGHT: 136.24 LBS | RESPIRATION RATE: 12 BRPM | SYSTOLIC BLOOD PRESSURE: 98 MMHG | BODY MASS INDEX: 23.26 KG/M2 | HEART RATE: 69 BPM | HEIGHT: 64 IN

## 2017-09-08 PROCEDURE — 93005 ELECTROCARDIOGRAM TRACING: CPT | Performed by: INTERNAL MEDICINE

## 2017-09-08 PROCEDURE — G0378 HOSPITAL OBSERVATION PER HR: HCPCS

## 2017-09-08 PROCEDURE — 99217 PR OBSERVATION CARE DISCHARGE MANAGEMENT: CPT | Performed by: PHYSICIAN ASSISTANT

## 2017-09-08 PROCEDURE — 93010 ELECTROCARDIOGRAM REPORT: CPT | Performed by: INTERNAL MEDICINE

## 2017-09-08 RX ADMIN — PANTOPRAZOLE SODIUM 40 MG: 40 TABLET, DELAYED RELEASE ORAL at 06:39

## 2017-09-08 RX ADMIN — LEVOTHYROXINE SODIUM 125 MCG: 125 TABLET ORAL at 06:39

## 2017-09-08 RX ADMIN — OXYCODONE AND ACETAMINOPHEN 1 TABLET: 5; 325 TABLET ORAL at 03:30

## 2017-09-11 ENCOUNTER — TELEPHONE (OUTPATIENT)
Dept: CARDIOLOGY | Facility: CLINIC | Age: 52
End: 2017-09-11

## 2017-09-11 NOTE — TELEPHONE ENCOUNTER
Patient called to let us know that her heart has been racing for the last couple of days. I explained to her that it is normal after the ablation. She is going to keep a log of her BP and HR over the next week and let us know if it continues to get worse.

## 2017-09-12 ENCOUNTER — APPOINTMENT (OUTPATIENT)
Dept: CARDIOLOGY | Facility: HOSPITAL | Age: 52
End: 2017-09-12

## 2017-10-03 ENCOUNTER — TELEPHONE (OUTPATIENT)
Dept: CARDIOLOGY | Facility: CLINIC | Age: 52
End: 2017-10-03

## 2017-10-03 NOTE — TELEPHONE ENCOUNTER
Start metoprolol 12.5 mg BID (short acting)   Keep a log of HRs. May need a monitor if not improved but with HRs in the 80s unlikely to be recurrent SVt      Ernesto

## 2017-10-03 NOTE — TELEPHONE ENCOUNTER
Patient called to let you know that since her ablation on 9/7/17, her heart has still been racing. I asked her how high her HR has gotten and she said in the 80's. She said that it feels like it is racing and pounding in her chest. So she has been taking Metoprolol 12.5 mg daily when it feels this way. She said that this stops it. Her BP today was 118/70 and HR has been in the 80's. Do you still want her to continue this? She was not discharged home on this.

## 2017-12-11 ENCOUNTER — OFFICE VISIT (OUTPATIENT)
Dept: CARDIOLOGY | Facility: CLINIC | Age: 52
End: 2017-12-11

## 2017-12-11 VITALS
HEIGHT: 64 IN | HEART RATE: 81 BPM | SYSTOLIC BLOOD PRESSURE: 120 MMHG | DIASTOLIC BLOOD PRESSURE: 74 MMHG | BODY MASS INDEX: 24.24 KG/M2 | WEIGHT: 142 LBS

## 2017-12-11 DIAGNOSIS — I31.9 PERICARDITIS, UNSPECIFIED CHRONICITY, UNSPECIFIED TYPE: ICD-10-CM

## 2017-12-11 DIAGNOSIS — I47.1 PSVT (PAROXYSMAL SUPRAVENTRICULAR TACHYCARDIA) (HCC): Primary | ICD-10-CM

## 2017-12-11 DIAGNOSIS — R00.2 PALPITATIONS: ICD-10-CM

## 2017-12-11 PROCEDURE — 99213 OFFICE O/P EST LOW 20 MIN: CPT | Performed by: INTERNAL MEDICINE

## 2017-12-11 NOTE — PROGRESS NOTES
Subjective:   Erlinda Steven  1965  [phone]  [phone]    12/11/2017    [facility] CARDIOLOGY    Terrie Pinedo, APRN  [address] [facility] [address]    REFERRING DOCTOR: Franck Ko      Patient ID: Erlinda Steven is a 52 y.o. female.    Chief Complaint:   Chief Complaint   Patient presents with   • Chest Pain   • Rapid Heart Rate       Allergies   Allergen Reactions   • Penicillins      Nausea and vomiting   • Sulfa Antibiotics      Nausea and vomiting       Current Outpatient Prescriptions:   •  aspirin 81 MG EC tablet, Take 81 mg by mouth Daily., Disp: , Rfl:   •  BUTRANS 10 MCG/HR patch weekly, Place 1 patch on the skin 1 (One) Time Per Week., Disp: , Rfl: 0  •  citalopram (CeleXA) 40 MG tablet, Take 40 mg by mouth Daily., Disp: , Rfl: 0  •  colchicine 0.6 MG capsule capsule, Take 0.6 mg by mouth Daily., Disp: , Rfl: 2  •  Cyanocobalamin (VITAMIN B-12 IJ), Inject  as directed 1 (One) Time Per Week., Disp: , Rfl:   •  estradiol (ESTRACE) 2 MG tablet, Take 2 mg by mouth Daily., Disp: , Rfl: 1  •  levothyroxine (SYNTHROID, LEVOTHROID) 125 MCG tablet, Take 125 mcg by mouth Daily., Disp: , Rfl: 0  •  methocarbamol (ROBAXIN) 500 MG tablet, Take 500 mg by mouth As Needed for Muscle Spasms., Disp: , Rfl: 0  •  omeprazole (priLOSEC) 20 MG capsule, Take 20 mg by mouth Daily., Disp: , Rfl: 0  •  ondansetron (ZOFRAN) 4 MG tablet, Take 4 mg by mouth Every 6 (Six) Hours As Needed for Nausea or Vomiting., Disp: , Rfl: 0  •  SPIRIVA RESPIMAT 2.5 MCG/ACT aerosol solution, Inhale 1 puff Daily., Disp: , Rfl: 0  •  tiZANidine (ZANAFLEX) 4 MG tablet, Take 2 mg by mouth Every Night., Disp: , Rfl: 0  •  vitamin D (ERGOCALCIFEROL) 57102 UNITS capsule capsule, Take 50,000 Units by mouth 2 (Two) Times a Week., Disp: , Rfl: 0    History of Present Illness  Patient is a 52-year-old female with a history of previous pericarditis in the past and recurrent SVT episodes despite the use of  "medications.  Now status post AVNRT ablation here for follow-up visit. Initially post had palpitations different then prior but everyday and bothersome. Now for the past 4 weeks on no meds no recurrences and feeling great.    No issues with chest pain, shortness of breath, fevers, chills, night sweats, PND, orthopnea. No recent ER visits or hospital stays.      The following portions of the patient's history were reviewed and updated as appropriate: allergies, current medications, past family history, past medical history, past social history, past surgical history and problem list.    ROS   14 point ROS negative except as outlined in problem list, HPI and other parts of the note.    Procedures       Objective:       Vitals:    12/11/17 1112   BP: 120/74   BP Location: Right arm   Patient Position: Sitting   Pulse: 81   Weight: 64.4 kg (142 lb)   Height: 162.6 cm (64\")       GENERAL: Well-developed, well-nourished patient in no acute distress.  HEENT: Normocephalic, atraumatic, PERRLA. Moist mucous membranes.  NECK: No JVD present at 30°. No carotid bruits auscultated.  LUNGS: Clear to auscultation.  CARDIOVASCULAR: Heart has a regular rate and rhythm. No murmurs, gallops or rubs noted.   ABDOMEN: Soft, nontender. Positive bowel sounds.  MUSCULOSKELETAL: No gross deformities. No clubbing, cyanosis, or lower extremity edema.  SKIN: Pink, warm  Neuro: Nonfocal exam. Gait intact  Ext: No edema or bruising    The patient's old records including ambulatory rhythm recordings (ECGs, Holter/event monitor) were reviewed and discussed.      Lab Review:   Results for orders placed or performed in visit on 09/06/17   CBC (No diff)   Result Value Ref Range    WBC 4.31 3.50 - 10.80 10*3/mm3    RBC 4.57 3.89 - 5.14 10*6/mm3    Hemoglobin 10.6 (L) 11.5 - 15.5 g/dL    Hematocrit 34.8 34.5 - 44.0 %    MCV 76.1 (L) 80.0 - 99.0 fL    MCH 23.2 (L) 27.0 - 31.0 pg    MCHC 30.5 (L) 32.0 - 36.0 g/dL    RDW 14.4 11.3 - 14.5 %    RDW-SD 40.4 " 37.0 - 54.0 fl    MPV 9.9 6.0 - 12.0 fL    Platelets 192 150 - 450 10*3/mm3   Comprehensive metabolic panel   Result Value Ref Range    Glucose 103 (H) 70 - 100 mg/dL    BUN 8 (L) 9 - 23 mg/dL    Creatinine 0.60 0.60 - 1.30 mg/dL    Sodium 137 132 - 146 mmol/L    Potassium 3.7 3.5 - 5.5 mmol/L    Chloride 105 99 - 109 mmol/L    CO2 27.0 20.0 - 31.0 mmol/L    Calcium 9.0 8.7 - 10.4 mg/dL    Total Protein 6.7 5.7 - 8.2 g/dL    Albumin 4.30 3.20 - 4.80 g/dL    ALT (SGPT) 24 7 - 40 U/L    AST (SGOT) 28 0 - 33 U/L    Alkaline Phosphatase 100 25 - 100 U/L    Total Bilirubin 0.2 (L) 0.3 - 1.2 mg/dL    eGFR Non African Amer 105 >60 mL/min/1.73    Globulin 2.4 gm/dL    A/G Ratio 1.8 1.5 - 2.5 g/dL    BUN/Creatinine Ratio 13.3 7.0 - 25.0    Anion Gap 5.0 3.0 - 11.0 mmol/L   Protime-INR   Result Value Ref Range    Protime 10.8 9.6 - 11.5 Seconds    INR 0.99    Magnesium   Result Value Ref Range    Magnesium 2.0 1.3 - 2.7 mg/dL           Diagnosis:   1. PSVT (paroxysmal supraventricular tachycardia)  2. Palpitations  3. Chronic Idiopathic Pericarditis    Assessment & Plan:   1. SVT s/p AVNRT ablation in August 2017 with no recurrences of palpitations for the past 4 weeks on no meds. Initially post procedure had palpitations everyday but different then before procedure but now resolved. If recurrent consider a 30 day event monitor  2. History of Pericarditis on Colchicine at this time.   3. Follow up with me in 6 mths and if no issues then make prn         CC: Franck Orozco,   12/11/17  11:20 AM      EMR Dragon/Transcription disclaimer:  Much of this encounter note is an electronic transcription/translation of spoken language to printed text. Electronic translation of spoken language may permit erroneous, or at times, nonsensical words or phrases to be inadvertently transcribed. Although I have reviewed the note for such errors, some may still exist.

## 2018-01-19 ENCOUNTER — TELEPHONE (OUTPATIENT)
Dept: CARDIOLOGY | Facility: CLINIC | Age: 53
End: 2018-01-19

## 2018-07-09 ENCOUNTER — OFFICE VISIT (OUTPATIENT)
Dept: CARDIOLOGY | Facility: CLINIC | Age: 53
End: 2018-07-09

## 2018-07-09 VITALS
BODY MASS INDEX: 25.03 KG/M2 | HEART RATE: 71 BPM | WEIGHT: 146.6 LBS | DIASTOLIC BLOOD PRESSURE: 80 MMHG | HEIGHT: 64 IN | SYSTOLIC BLOOD PRESSURE: 120 MMHG

## 2018-07-09 DIAGNOSIS — I31.1 OTHER CHRONIC CONSTRICTIVE PERICARDITIS: ICD-10-CM

## 2018-07-09 DIAGNOSIS — I47.1 SVT (SUPRAVENTRICULAR TACHYCARDIA) (HCC): Primary | ICD-10-CM

## 2018-07-09 PROCEDURE — 99213 OFFICE O/P EST LOW 20 MIN: CPT | Performed by: INTERNAL MEDICINE

## 2018-07-09 RX ORDER — PYRAZINAMIDE 500 MG/1
1 TABLET ORAL AS NEEDED
COMMUNITY
End: 2022-11-10

## 2018-07-09 NOTE — PROGRESS NOTES
Subjective:   Erlinda Steven  1965  [phone]  [phone]    12/11/2017    [facility] CARDIOLOGY    Shannan De La Cruz, APRN  [address] [address]    REFERRING DOCTOR: Franck Ko      Patient ID: Erlinda Steven is a 53 y.o. female.    Chief Complaint:   Chief Complaint   Patient presents with   • PSVT   • Zari-Parkinson-White Syndrome       Allergies   Allergen Reactions   • Penicillins      Nausea and vomiting   • Requip [Ropinirole Hcl] Nausea And Vomiting   • Sulfa Antibiotics      Nausea and vomiting       Current Outpatient Prescriptions:   •  acetaminophen-codeine (TYLENOL/CODEINE #3) 300-30 MG per tablet, Take 1 tablet by mouth As Needed for Moderate Pain ., Disp: , Rfl:   •  aspirin 81 MG EC tablet, Take 81 mg by mouth Daily., Disp: , Rfl:   •  BUTRANS 10 MCG/HR patch weekly, Place 1 patch on the skin 1 (One) Time Per Week., Disp: , Rfl: 0  •  citalopram (CeleXA) 40 MG tablet, Take 40 mg by mouth Daily., Disp: , Rfl: 0  •  colchicine 0.6 MG capsule capsule, Take 0.6 mg by mouth Daily., Disp: , Rfl: 2  •  Cyanocobalamin (VITAMIN B-12 IJ), Inject  as directed 1 (One) Time Per Week., Disp: , Rfl:   •  estradiol (ESTRACE) 2 MG tablet, Take 2 mg by mouth Daily., Disp: , Rfl: 1  •  levothyroxine (SYNTHROID, LEVOTHROID) 125 MCG tablet, Take 125 mcg by mouth Daily., Disp: , Rfl: 0  •  methocarbamol (ROBAXIN) 500 MG tablet, Take 500 mg by mouth As Needed for Muscle Spasms., Disp: , Rfl: 0  •  omeprazole (priLOSEC) 20 MG capsule, Take 20 mg by mouth Daily., Disp: , Rfl: 0  •  ondansetron (ZOFRAN) 4 MG tablet, Take 4 mg by mouth Every 6 (Six) Hours As Needed for Nausea or Vomiting., Disp: , Rfl: 0  •  SPIRIVA RESPIMAT 2.5 MCG/ACT aerosol solution, Inhale 1 puff Daily., Disp: , Rfl: 0  •  tiZANidine (ZANAFLEX) 4 MG tablet, Take 2 mg by mouth As Needed., Disp: , Rfl: 0  •  vitamin D (ERGOCALCIFEROL) 67875 UNITS capsule capsule, Take 50,000 Units by mouth 2 (Two)  "Times a Week., Disp: , Rfl: 0    History of Present Illness  Patient is a 53-year-old female with a history of previous pericarditis in the past and recurrent SVT episodes despite the use of medications.  Now status post AVNRT ablation here for follow-up visit. Initially post had palpitations different then prior to ablation and did well until one month ago. Again, having palpitations short lived and different than before ablation but has had up to 6 times since the ablation. Feels like \"flip flop\" then beats fast for seconds then goes away.   No issues with chest pain, shortness of breath, fevers, chills, night sweats, PND, orthopnea. No recent ER visits or hospital stays.      The following portions of the patient's history were reviewed and updated as appropriate: allergies, current medications, past family history, past medical history, past social history, past surgical history and problem list.    ROS   14 point ROS negative except as outlined in problem list, HPI and other parts of the note.    Procedures       Objective:       Vitals:    07/09/18 1123   BP: 120/80   BP Location: Right arm   Patient Position: Sitting   Pulse: 71   Weight: 66.5 kg (146 lb 9.6 oz)   Height: 162.6 cm (64\")       GENERAL: Well-developed, well-nourished patient in no acute distress.  HEENT: Normocephalic, atraumatic, PERRLA. Moist mucous membranes.  NECK: No JVD present at 30°. No carotid bruits auscultated.  LUNGS: Clear to auscultation.  CARDIOVASCULAR: Heart has a regular rate and rhythm. No murmurs, gallops or rubs noted.   ABDOMEN: Soft, nontender. Positive bowel sounds.  MUSCULOSKELETAL: No gross deformities. No clubbing, cyanosis, or lower extremity edema.  SKIN: Pink, warm  Neuro: Nonfocal exam. Gait intact  Ext: No edema or bruising    The patient's old records including ambulatory rhythm recordings (ECGs, Holter/event monitor) were reviewed and discussed.      Lab Review:   Results for orders placed or performed in visit " on 09/06/17   CBC (No diff)   Result Value Ref Range    WBC 4.31 3.50 - 10.80 10*3/mm3    RBC 4.57 3.89 - 5.14 10*6/mm3    Hemoglobin 10.6 (L) 11.5 - 15.5 g/dL    Hematocrit 34.8 34.5 - 44.0 %    MCV 76.1 (L) 80.0 - 99.0 fL    MCH 23.2 (L) 27.0 - 31.0 pg    MCHC 30.5 (L) 32.0 - 36.0 g/dL    RDW 14.4 11.3 - 14.5 %    RDW-SD 40.4 37.0 - 54.0 fl    MPV 9.9 6.0 - 12.0 fL    Platelets 192 150 - 450 10*3/mm3   Comprehensive metabolic panel   Result Value Ref Range    Glucose 103 (H) 70 - 100 mg/dL    BUN 8 (L) 9 - 23 mg/dL    Creatinine 0.60 0.60 - 1.30 mg/dL    Sodium 137 132 - 146 mmol/L    Potassium 3.7 3.5 - 5.5 mmol/L    Chloride 105 99 - 109 mmol/L    CO2 27.0 20.0 - 31.0 mmol/L    Calcium 9.0 8.7 - 10.4 mg/dL    Total Protein 6.7 5.7 - 8.2 g/dL    Albumin 4.30 3.20 - 4.80 g/dL    ALT (SGPT) 24 7 - 40 U/L    AST (SGOT) 28 0 - 33 U/L    Alkaline Phosphatase 100 25 - 100 U/L    Total Bilirubin 0.2 (L) 0.3 - 1.2 mg/dL    eGFR Non African Amer 105 >60 mL/min/1.73    Globulin 2.4 gm/dL    A/G Ratio 1.8 1.5 - 2.5 g/dL    BUN/Creatinine Ratio 13.3 7.0 - 25.0    Anion Gap 5.0 3.0 - 11.0 mmol/L   Protime-INR   Result Value Ref Range    Protime 10.8 9.6 - 11.5 Seconds    INR 0.99    Magnesium   Result Value Ref Range    Magnesium 2.0 1.3 - 2.7 mg/dL           Diagnosis:   1. PSVT (paroxysmal supraventricular tachycardia)  2. Palpitations  3. Chronic Idiopathic Pericarditis    Assessment & Plan:   1. SVT s/p AVNRT ablation in August 2017 with no recurrences of palpitations for the past 4 weeks on no meds. Initially post procedure had palpitations everyday then resolved but now back , short lived but still with bothersome palpitations In the past month a total of 6 times. Lasts for about 30 seconds. After discussion with patient and her long term goal is to not take too medications will get a 30 day event monitor to help with best course of action. Consider medical Rx (BB, Flec etc) vs redo EP study +/- RFA depending on event  results.   2. History of Pericarditis on Colchicine at this time.   3. Follow up with me in 3-4 mths         CC: Franck Orozco DO  07/09/18  11:43 AM      EMR Dragon/Transcription disclaimer:  Much of this encounter note is an electronic transcription/translation of spoken language to printed text. Electronic translation of spoken language may permit erroneous, or at times, nonsensical words or phrases to be inadvertently transcribed. Although I have reviewed the note for such errors, some may still exist.

## 2020-08-25 ENCOUNTER — LAB (OUTPATIENT)
Dept: LAB | Facility: HOSPITAL | Age: 55
End: 2020-08-25

## 2020-08-25 ENCOUNTER — TRANSCRIBE ORDERS (OUTPATIENT)
Dept: LAB | Facility: HOSPITAL | Age: 55
End: 2020-08-25

## 2020-08-25 DIAGNOSIS — R50.9 FEVER, UNSPECIFIED: ICD-10-CM

## 2020-08-25 DIAGNOSIS — R50.9 FEVER, UNSPECIFIED: Primary | ICD-10-CM

## 2020-08-25 PROCEDURE — C9803 HOPD COVID-19 SPEC COLLECT: HCPCS

## 2020-08-25 PROCEDURE — U0002 COVID-19 LAB TEST NON-CDC: HCPCS

## 2020-08-25 PROCEDURE — U0004 COV-19 TEST NON-CDC HGH THRU: HCPCS

## 2020-08-26 LAB
REF LAB TEST METHOD: NORMAL
SARS-COV-2 RNA RESP QL NAA+PROBE: NOT DETECTED

## 2020-08-27 ENCOUNTER — HOSPITAL ENCOUNTER (EMERGENCY)
Facility: HOSPITAL | Age: 55
Discharge: HOME | End: 2020-08-27
Attending: EMERGENCY MEDICINE | Admitting: EMERGENCY MEDICINE

## 2020-08-27 ENCOUNTER — APPOINTMENT (OUTPATIENT)
Dept: CT IMAGING | Facility: HOSPITAL | Age: 55
End: 2020-08-27

## 2020-08-27 VITALS
DIASTOLIC BLOOD PRESSURE: 78 MMHG | HEIGHT: 64 IN | HEART RATE: 91 BPM | RESPIRATION RATE: 18 BRPM | SYSTOLIC BLOOD PRESSURE: 127 MMHG | OXYGEN SATURATION: 95 % | BODY MASS INDEX: 23.9 KG/M2 | WEIGHT: 140 LBS | TEMPERATURE: 98.8 F

## 2020-08-27 DIAGNOSIS — E87.6 HYPOKALEMIA: ICD-10-CM

## 2020-08-27 DIAGNOSIS — K52.9 COLITIS: Primary | ICD-10-CM

## 2020-08-27 LAB
ALBUMIN SERPL-MCNC: 3.8 G/DL (ref 3.5–5.2)
ALBUMIN/GLOB SERPL: 1.5 G/DL
ALP SERPL-CCNC: 78 U/L (ref 39–117)
ALT SERPL W P-5'-P-CCNC: 8 U/L (ref 1–33)
ANION GAP SERPL CALCULATED.3IONS-SCNC: 13.2 MMOL/L (ref 5–15)
AST SERPL-CCNC: 13 U/L (ref 1–32)
BACTERIA UR QL AUTO: ABNORMAL /HPF
BASOPHILS # BLD AUTO: 0.08 10*3/MM3 (ref 0–0.2)
BASOPHILS NFR BLD AUTO: 0.5 % (ref 0–1.5)
BILIRUB SERPL-MCNC: 0.2 MG/DL (ref 0–1.2)
BILIRUB UR QL STRIP: NEGATIVE
BUN SERPL-MCNC: 3 MG/DL (ref 6–20)
BUN/CREAT SERPL: 5 (ref 7–25)
C DIFF GDH STL QL: NEGATIVE
CALCIUM SPEC-SCNC: 8.3 MG/DL (ref 8.6–10.5)
CHLORIDE SERPL-SCNC: 100 MMOL/L (ref 98–107)
CLARITY UR: CLEAR
CO2 SERPL-SCNC: 24.8 MMOL/L (ref 22–29)
COLOR UR: YELLOW
CREAT SERPL-MCNC: 0.6 MG/DL (ref 0.57–1)
DEPRECATED RDW RBC AUTO: 41.9 FL (ref 37–54)
EOSINOPHIL # BLD AUTO: 0.08 10*3/MM3 (ref 0–0.4)
EOSINOPHIL NFR BLD AUTO: 0.5 % (ref 0.3–6.2)
ERYTHROCYTE [DISTWIDTH] IN BLOOD BY AUTOMATED COUNT: 13.6 % (ref 12.3–15.4)
GFR SERPL CREATININE-BSD FRML MDRD: 104 ML/MIN/1.73
GLOBULIN UR ELPH-MCNC: 2.5 GM/DL
GLUCOSE SERPL-MCNC: 108 MG/DL (ref 65–99)
GLUCOSE UR STRIP-MCNC: NEGATIVE MG/DL
HCT VFR BLD AUTO: 35.6 % (ref 34–46.6)
HGB BLD-MCNC: 11.9 G/DL (ref 12–15.9)
HGB UR QL STRIP.AUTO: NEGATIVE
HOLD SPECIMEN: NORMAL
HOLD SPECIMEN: NORMAL
HYALINE CASTS UR QL AUTO: ABNORMAL /LPF
IMM GRANULOCYTES # BLD AUTO: 0.07 10*3/MM3 (ref 0–0.05)
IMM GRANULOCYTES NFR BLD AUTO: 0.5 % (ref 0–0.5)
KETONES UR QL STRIP: ABNORMAL
LEUKOCYTE ESTERASE UR QL STRIP.AUTO: NEGATIVE
LIPASE SERPL-CCNC: 83 U/L (ref 13–60)
LYMPHOCYTES # BLD AUTO: 1.7 10*3/MM3 (ref 0.7–3.1)
LYMPHOCYTES NFR BLD AUTO: 11.7 % (ref 19.6–45.3)
MCH RBC QN AUTO: 28.5 PG (ref 26.6–33)
MCHC RBC AUTO-ENTMCNC: 33.4 G/DL (ref 31.5–35.7)
MCV RBC AUTO: 85.2 FL (ref 79–97)
MONOCYTES # BLD AUTO: 1.05 10*3/MM3 (ref 0.1–0.9)
MONOCYTES NFR BLD AUTO: 7.2 % (ref 5–12)
NEUTROPHILS NFR BLD AUTO: 11.61 10*3/MM3 (ref 1.7–7)
NEUTROPHILS NFR BLD AUTO: 79.6 % (ref 42.7–76)
NITRITE UR QL STRIP: NEGATIVE
NRBC BLD AUTO-RTO: 0 /100 WBC (ref 0–0.2)
PH UR STRIP.AUTO: 5.5 [PH] (ref 5–8)
PLATELET # BLD AUTO: 227 10*3/MM3 (ref 140–450)
PMV BLD AUTO: 8.9 FL (ref 6–12)
POTASSIUM SERPL-SCNC: 3 MMOL/L (ref 3.5–5.2)
PROT SERPL-MCNC: 6.3 G/DL (ref 6–8.5)
PROT UR QL STRIP: ABNORMAL
RBC # BLD AUTO: 4.18 10*6/MM3 (ref 3.77–5.28)
RBC # UR: ABNORMAL /HPF
REF LAB TEST METHOD: ABNORMAL
SODIUM SERPL-SCNC: 138 MMOL/L (ref 136–145)
SP GR UR STRIP: 1.02 (ref 1–1.03)
SQUAMOUS #/AREA URNS HPF: ABNORMAL /HPF
UROBILINOGEN UR QL STRIP: ABNORMAL
WBC # BLD AUTO: 14.59 10*3/MM3 (ref 3.4–10.8)
WBC UR QL AUTO: ABNORMAL /HPF
WHOLE BLOOD HOLD SPECIMEN: NORMAL
WHOLE BLOOD HOLD SPECIMEN: NORMAL

## 2020-08-27 PROCEDURE — 87449 NOS EACH ORGANISM AG IA: CPT | Performed by: PHYSICIAN ASSISTANT

## 2020-08-27 PROCEDURE — 87324 CLOSTRIDIUM AG IA: CPT | Performed by: PHYSICIAN ASSISTANT

## 2020-08-27 PROCEDURE — 99283 EMERGENCY DEPT VISIT LOW MDM: CPT

## 2020-08-27 PROCEDURE — 85025 COMPLETE CBC W/AUTO DIFF WBC: CPT | Performed by: PHYSICIAN ASSISTANT

## 2020-08-27 PROCEDURE — 96360 HYDRATION IV INFUSION INIT: CPT

## 2020-08-27 PROCEDURE — 83690 ASSAY OF LIPASE: CPT | Performed by: PHYSICIAN ASSISTANT

## 2020-08-27 PROCEDURE — 80053 COMPREHEN METABOLIC PANEL: CPT | Performed by: PHYSICIAN ASSISTANT

## 2020-08-27 PROCEDURE — 81001 URINALYSIS AUTO W/SCOPE: CPT | Performed by: PHYSICIAN ASSISTANT

## 2020-08-27 PROCEDURE — 25010000002 IOPAMIDOL 61 % SOLUTION: Performed by: EMERGENCY MEDICINE

## 2020-08-27 PROCEDURE — 74177 CT ABD & PELVIS W/CONTRAST: CPT

## 2020-08-27 RX ORDER — ONDANSETRON 2 MG/ML
4 INJECTION INTRAMUSCULAR; INTRAVENOUS ONCE
Status: DISCONTINUED | OUTPATIENT
Start: 2020-08-27 | End: 2020-08-27 | Stop reason: HOSPADM

## 2020-08-27 RX ORDER — POTASSIUM CHLORIDE 750 MG/1
40 CAPSULE, EXTENDED RELEASE ORAL ONCE
Status: COMPLETED | OUTPATIENT
Start: 2020-08-27 | End: 2020-08-27

## 2020-08-27 RX ORDER — POTASSIUM CHLORIDE 750 MG/1
10 TABLET, FILM COATED, EXTENDED RELEASE ORAL 2 TIMES DAILY
Qty: 10 TABLET | Refills: 0 | Status: SHIPPED | OUTPATIENT
Start: 2020-08-27 | End: 2020-09-01

## 2020-08-27 RX ORDER — LEVOFLOXACIN 500 MG/1
500 TABLET, FILM COATED ORAL DAILY
Qty: 10 TABLET | Refills: 0 | Status: SHIPPED | OUTPATIENT
Start: 2020-08-27 | End: 2020-09-06

## 2020-08-27 RX ORDER — SODIUM CHLORIDE 0.9 % (FLUSH) 0.9 %
10 SYRINGE (ML) INJECTION AS NEEDED
Status: DISCONTINUED | OUTPATIENT
Start: 2020-08-27 | End: 2020-08-27 | Stop reason: HOSPADM

## 2020-08-27 RX ADMIN — IOPAMIDOL 100 ML: 612 INJECTION, SOLUTION INTRAVENOUS at 14:37

## 2020-08-27 RX ADMIN — SODIUM CHLORIDE 1000 ML: 9 INJECTION, SOLUTION INTRAVENOUS at 13:42

## 2020-08-27 RX ADMIN — POTASSIUM CHLORIDE 40 MEQ: 10 CAPSULE, COATED, EXTENDED RELEASE ORAL at 14:20

## 2020-08-27 NOTE — ED PROVIDER NOTES
Subjective   55-year-old female presents with lower abdominal pain, fever, and diarrhea.  She states that every time she eats or drinks he has multiple episodes of diarrhea.  This is been going on for 1 week.  She has had an unintentional 18 pound weight loss.  She is never had symptoms like this in the past.  The diarrhea is loose, and is colored and oranges.  No blood in her stool.      History provided by:  Patient   used: No        Review of Systems   Gastrointestinal: Positive for diarrhea.   Neurological: Positive for weakness.   All other systems reviewed and are negative.      Past Medical History:   Diagnosis Date   • Acid reflux    • Arthritis    • Asthma     HX of   • Back pain    • Chest pain     2/2acute pericarditis ( hx of recurrent episode since late teens) diffuse, non specific EKG, no troponin evaluation to suggest myopericarditis has been treated in the past with prednisone but this may encourage recurrence   • Chicken pox    • COPD (chronic obstructive pulmonary disease) (CMS/[facility])    • Dental bridge present    • Hypothyroid    • Measles    • Menopause    • MI, old 2015    Cath @ [facility]/ non-obstructive CAD.     • Morbid obesity (CMS/[facility]) 2013    S/P gastric bypass   • Nausea    • Pericarditis 2016   • PSVT (paroxysmal supraventricular tachycardia) (CMS/[facility]) 08/2017   • Scarlet fever    • Ulcer    • UTI (urinary tract infection)     2 months ago- denies current s/s   • Wears eyeglasses    • Zari-Parkinson-White (WPW) syndrome        Allergies   Allergen Reactions   • Penicillins      Nausea and vomiting   • Requip [Ropinirole Hcl] Nausea And Vomiting   • Sulfa Antibiotics      Nausea and vomiting       Past Surgical History:   Procedure Laterality Date   • BREAST LUMPECTOMY Bilateral 2010   • CARDIAC CATHETERIZATION  2015   • CARDIAC ELECTROPHYSIOLOGY PROCEDURE N/A 9/7/2017    Procedure: Ablation SVT;  Surgeon: Javier Orozco DO;  Location: [facility] INVASIVE  LOCATION;  Service:    • CHOLECYSTECTOMY     • COLONOSCOPY       4 years ago   • CYSTECTOMY  1984    From left wrist   • GASTRIC BYPASS  2013   • HYSTERECTOMY  2010    partial   • OTHER SURGICAL HISTORY      Left ganglion cyst   • OTHER SURGICAL HISTORY      Plate and pin in left radial head   • TUBAL ABDOMINAL LIGATION         Family History   Problem Relation Age of Onset   • Heart attack Mother    • Arthritis Mother    • Hypertension Mother    • Obesity Mother    • Migraines Mother    • Hypertension Father    • Diabetes Father    • Stroke Father    • Obesity Father    • Cancer Paternal Grandmother         Colon       Social History     Socioeconomic History   • Marital status:      Spouse name: Not on file   • Number of children: Not on file   • Years of education: Not on file   • Highest education level: Not on file   Tobacco Use   • Smoking status: Former Smoker     Packs/day: 1.00     Years: 30.00     Pack years: 30.00     Types: Cigarettes     Last attempt to quit: 2009     Years since quittin.0   • Smokeless tobacco: Never Used   Substance and Sexual Activity   • Alcohol use: Yes     Comment: occasional   • Drug use: No   • Sexual activity: Defer   Social History Narrative    Patient consumes 2 serving of caffeine daily.     Patient lives at home with .            Objective   Physical Exam   Constitutional: She is oriented to person, place, and time. She appears well-developed and well-nourished.   Eyes: EOM are normal.   Neck: Normal range of motion. Neck supple.   Cardiovascular: Normal rate and regular rhythm.   Pulmonary/Chest: Effort normal and breath sounds normal.   Abdominal: Soft. She exhibits no distension. There is tenderness. There is no guarding.   Musculoskeletal: Normal range of motion.   Neurological: She is alert and oriented to person, place, and time. She has normal reflexes.   Skin: Skin is warm and dry.   Psychiatric: She has a normal mood and affect.   Nursing  note and vitals reviewed.      Procedures           ED Course  ED Course as of Aug 27 1718   Thu Aug 27, 2020   1502 Discussed  with Dr. George, he recommended collecting for C. difficile if possible, and then either Cipro and Flagyl versus just Flagyl if C. difficile is negative.  Close follow-up in the office is recommended as well he wants the patient to call the office to make an appointment.    [CS]      ED Course User Index  [CS] Kirk Trevizo Jr., PA-C                                           MDM  Number of Diagnoses or Management Options  Colitis: new and requires workup  Hypokalemia: new and requires workup     Amount and/or Complexity of Data Reviewed  Clinical lab tests: reviewed  Tests in the radiology section of CPT®: reviewed    Risk of Complications, Morbidity, and/or Mortality  Presenting problems: minimal  Diagnostic procedures: minimal  Management options: minimal    Patient Progress  Patient progress: stable      Final diagnoses:   Colitis   Hypokalemia            Kirk Trevizo Jr., PA-C  08/27/20 9121

## 2020-11-28 ENCOUNTER — APPOINTMENT (OUTPATIENT)
Dept: GENERAL RADIOLOGY | Facility: HOSPITAL | Age: 55
End: 2020-11-28

## 2020-11-28 ENCOUNTER — HOSPITAL ENCOUNTER (EMERGENCY)
Facility: HOSPITAL | Age: 55
Discharge: HOME | End: 2020-11-28
Attending: EMERGENCY MEDICINE | Admitting: EMERGENCY MEDICINE

## 2020-11-28 VITALS
TEMPERATURE: 98.1 F | HEIGHT: 64 IN | SYSTOLIC BLOOD PRESSURE: 126 MMHG | WEIGHT: 148 LBS | RESPIRATION RATE: 20 BRPM | BODY MASS INDEX: 25.27 KG/M2 | DIASTOLIC BLOOD PRESSURE: 83 MMHG | HEART RATE: 60 BPM | OXYGEN SATURATION: 94 %

## 2020-11-28 DIAGNOSIS — R07.9 CHEST PAIN, UNSPECIFIED TYPE: Primary | ICD-10-CM

## 2020-11-28 LAB
ALBUMIN SERPL-MCNC: 4.5 G/DL (ref 3.5–5.2)
ALBUMIN/GLOB SERPL: 1.7 G/DL
ALP SERPL-CCNC: 78 U/L (ref 39–117)
ALT SERPL W P-5'-P-CCNC: 12 U/L (ref 1–33)
ANION GAP SERPL CALCULATED.3IONS-SCNC: 8.8 MMOL/L (ref 5–15)
AST SERPL-CCNC: 20 U/L (ref 1–32)
BASOPHILS # BLD AUTO: 0.05 10*3/MM3 (ref 0–0.2)
BASOPHILS NFR BLD AUTO: 0.8 % (ref 0–1.5)
BILIRUB SERPL-MCNC: 0.2 MG/DL (ref 0–1.2)
BUN SERPL-MCNC: 10 MG/DL (ref 6–20)
BUN/CREAT SERPL: 13.5 (ref 7–25)
CALCIUM SPEC-SCNC: 9.2 MG/DL (ref 8.6–10.5)
CHLORIDE SERPL-SCNC: 98 MMOL/L (ref 98–107)
CO2 SERPL-SCNC: 28.2 MMOL/L (ref 22–29)
CREAT SERPL-MCNC: 0.74 MG/DL (ref 0.57–1)
D DIMER PPP FEU-MCNC: 0.23 MCGFEU/ML (ref 0–0.57)
DEPRECATED RDW RBC AUTO: 45.1 FL (ref 37–54)
EOSINOPHIL # BLD AUTO: 0.05 10*3/MM3 (ref 0–0.4)
EOSINOPHIL NFR BLD AUTO: 0.8 % (ref 0.3–6.2)
ERYTHROCYTE [DISTWIDTH] IN BLOOD BY AUTOMATED COUNT: 13.8 % (ref 12.3–15.4)
GFR SERPL CREATININE-BSD FRML MDRD: 82 ML/MIN/1.73
GLOBULIN UR ELPH-MCNC: 2.7 GM/DL
GLUCOSE SERPL-MCNC: 104 MG/DL (ref 65–99)
HCT VFR BLD AUTO: 39.3 % (ref 34–46.6)
HGB BLD-MCNC: 12.3 G/DL (ref 12–15.9)
HOLD SPECIMEN: NORMAL
HOLD SPECIMEN: NORMAL
IMM GRANULOCYTES # BLD AUTO: 0.02 10*3/MM3 (ref 0–0.05)
IMM GRANULOCYTES NFR BLD AUTO: 0.3 % (ref 0–0.5)
LYMPHOCYTES # BLD AUTO: 2.57 10*3/MM3 (ref 0.7–3.1)
LYMPHOCYTES NFR BLD AUTO: 41.6 % (ref 19.6–45.3)
MCH RBC QN AUTO: 28 PG (ref 26.6–33)
MCHC RBC AUTO-ENTMCNC: 31.3 G/DL (ref 31.5–35.7)
MCV RBC AUTO: 89.3 FL (ref 79–97)
MONOCYTES # BLD AUTO: 0.53 10*3/MM3 (ref 0.1–0.9)
MONOCYTES NFR BLD AUTO: 8.6 % (ref 5–12)
NEUTROPHILS NFR BLD AUTO: 2.96 10*3/MM3 (ref 1.7–7)
NEUTROPHILS NFR BLD AUTO: 47.9 % (ref 42.7–76)
NRBC BLD AUTO-RTO: 0 /100 WBC (ref 0–0.2)
PLATELET # BLD AUTO: 257 10*3/MM3 (ref 140–450)
PMV BLD AUTO: 9.9 FL (ref 6–12)
POTASSIUM SERPL-SCNC: 3.9 MMOL/L (ref 3.5–5.2)
PROT SERPL-MCNC: 7.2 G/DL (ref 6–8.5)
RBC # BLD AUTO: 4.4 10*6/MM3 (ref 3.77–5.28)
SODIUM SERPL-SCNC: 135 MMOL/L (ref 136–145)
TROPONIN T SERPL-MCNC: <0.01 NG/ML (ref 0–0.03)
TROPONIN T SERPL-MCNC: <0.01 NG/ML (ref 0–0.03)
WBC # BLD AUTO: 6.18 10*3/MM3 (ref 3.4–10.8)
WHOLE BLOOD HOLD SPECIMEN: NORMAL
WHOLE BLOOD HOLD SPECIMEN: NORMAL

## 2020-11-28 PROCEDURE — 80053 COMPREHEN METABOLIC PANEL: CPT

## 2020-11-28 PROCEDURE — 84484 ASSAY OF TROPONIN QUANT: CPT | Performed by: EMERGENCY MEDICINE

## 2020-11-28 PROCEDURE — 85025 COMPLETE CBC W/AUTO DIFF WBC: CPT

## 2020-11-28 PROCEDURE — 96374 THER/PROPH/DIAG INJ IV PUSH: CPT

## 2020-11-28 PROCEDURE — 84484 ASSAY OF TROPONIN QUANT: CPT

## 2020-11-28 PROCEDURE — 71045 X-RAY EXAM CHEST 1 VIEW: CPT

## 2020-11-28 PROCEDURE — 85379 FIBRIN DEGRADATION QUANT: CPT | Performed by: EMERGENCY MEDICINE

## 2020-11-28 PROCEDURE — 25010000002 MORPHINE PER 10 MG: Performed by: EMERGENCY MEDICINE

## 2020-11-28 PROCEDURE — 93005 ELECTROCARDIOGRAM TRACING: CPT | Performed by: EMERGENCY MEDICINE

## 2020-11-28 PROCEDURE — 93005 ELECTROCARDIOGRAM TRACING: CPT

## 2020-11-28 PROCEDURE — 99284 EMERGENCY DEPT VISIT MOD MDM: CPT

## 2020-11-28 RX ORDER — GABAPENTIN 100 MG/1
100 CAPSULE ORAL DAILY PRN
COMMUNITY
End: 2022-11-10

## 2020-11-28 RX ORDER — MORPHINE SULFATE 4 MG/ML
4 INJECTION, SOLUTION INTRAMUSCULAR; INTRAVENOUS ONCE
Status: COMPLETED | OUTPATIENT
Start: 2020-11-28 | End: 2020-11-28

## 2020-11-28 RX ORDER — SODIUM CHLORIDE 0.9 % (FLUSH) 0.9 %
10 SYRINGE (ML) INJECTION AS NEEDED
Status: DISCONTINUED | OUTPATIENT
Start: 2020-11-28 | End: 2020-11-28 | Stop reason: HOSPADM

## 2020-11-28 RX ORDER — ASPIRIN 325 MG
325 TABLET ORAL ONCE
Status: COMPLETED | OUTPATIENT
Start: 2020-11-28 | End: 2020-11-28

## 2020-11-28 RX ADMIN — ASPIRIN 325 MG ORAL TABLET 325 MG: 325 PILL ORAL at 16:42

## 2020-11-28 RX ADMIN — MORPHINE SULFATE 4 MG: 4 INJECTION, SOLUTION INTRAMUSCULAR; INTRAVENOUS at 16:43

## 2021-08-11 NOTE — PROGRESS NOTES
[facility]  Cardiovascular Progress Note  Erlinda Steven  2516/[address]  [id number]  1965    DATE OF ADMISSION: 9/7/2017  DATE OF FOLLOW UP:  9/8/17    PK Ramirez    Subjective:     Patient ID: Erlinda Steven is a 52 y.o. female.    Chief Complaint: svt f/u     Allergies   Allergen Reactions   • Penicillins      Nausea and vomiting   • Sulfa Antibiotics      Nausea and vomiting       Current Facility-Administered Medications:   •  aspirin EC tablet 81 mg, 81 mg, Oral, Daily, MICHELLE Cassidy, 81 mg at 09/07/17 1652  •  citalopram (CeleXA) tablet 40 mg, 40 mg, Oral, Daily, MICHELLE Cassidy  •  colchicine tablet 0.6 mg, 0.6 mg, Oral, Nightly, MICHELLE Cassidy, 0.6 mg at 09/07/17 2156  •  levothyroxine (SYNTHROID, LEVOTHROID) tablet 125 mcg, 125 mcg, Oral, Q AM, MICHELLE Cassidy, 125 mcg at 09/08/17 0639  •  ondansetron (ZOFRAN) injection 4 mg, 4 mg, Intravenous, Q6H PRN, Javier Ernesto, DO  •  oxyCODONE-acetaminophen (PERCOCET) 5-325 MG per tablet 1 tablet, 1 tablet, Oral, Q4H PRN, Javier Ernesto, DO, 1 tablet at 09/08/17 0330  •  pantoprazole (PROTONIX) EC tablet 40 mg, 40 mg, Oral, QAM, MICHELLE Cassidy, 40 mg at 09/08/17 0639  •  tiZANidine (ZANAFLEX) tablet 2 mg, 2 mg, Oral, Nightly, MICHELLE Cassidy, 2 mg at 09/07/17 2156    History of Present Illness    Feeling well this AM with no complaints.     ROS   14 point ROS negative except as outlined in problem list, HPI and other parts of the note.    Procedures       Objective:       Vitals:    09/08/17 0200 09/08/17 0300 09/08/17 0400 09/08/17 0600   BP: 101/71 106/65 99/57 95/63   BP Location:       Patient Position:       Pulse: 66 68 66 64   Resp:       Temp:       TempSrc:       SpO2: 96% 97% 98% 91%   Weight:       Height:         No intake or output data in the 24 hours ending 09/08/17 0723    GENERAL: Well-developed, well-nourished patient in no acute distress.  NECK: No JVD present at 30°. No carotid  No bruits auscultated.  LUNGS: Clear to auscultation.  CARDIOVASCULAR: Heart has a regular rate and rhythm. No murmurs, gallops or rubs noted.   EXT: pulses intact, no swelling. Groin looks good with no hematoma.   SKIN: Pink, warm      The patient's old records including ambulatory rhythm recordings (ECGs, Holter/event monitor) were reviewed and discussed.      Lab Review:     Results from last 7 days  Lab Units 09/06/17  1625   SODIUM mmol/L 137   POTASSIUM mmol/L 3.7   CHLORIDE mmol/L 105   CO2 mmol/L 27.0   BUN mg/dL 8*   CREATININE mg/dL 0.60   GLUCOSE mg/dL 103*   CALCIUM mg/dL 9.0           Results from last 7 days  Lab Units 09/06/17  1625   WBC 10*3/mm3 4.31   HEMOGLOBIN g/dL 10.6*   HEMATOCRIT % 34.8   PLATELETS 10*3/mm3 192       Results from last 7 days  Lab Units 09/06/17  1625   INR  0.99           Results from last 7 days  Lab Units 09/06/17  1625   MAGNESIUM mg/dL 2.0         Assessment & Plan:     1. SVT   - s/p successful rfa of AVNRT of common type   - EKG wnl this morning.     Patient will be discharged home in stable condition. Follow-up in 10-12 weeks.          MICHELLE Watson  09/08/17  7:23 AM      IJavier, have reviewed the note in full and agree with all aspects of the above including physical exam, assessment, labs and plan with changes made accordingly. Face to Face Time was spent with the patient.    Javier Orozco DO  09/08/17  8:39 AM

## 2022-10-24 ENCOUNTER — APPOINTMENT (OUTPATIENT)
Dept: CT IMAGING | Facility: HOSPITAL | Age: 57
End: 2022-10-24

## 2022-10-24 ENCOUNTER — HOSPITAL ENCOUNTER (EMERGENCY)
Facility: HOSPITAL | Age: 57
Discharge: HOME | End: 2022-10-24
Attending: EMERGENCY MEDICINE | Admitting: EMERGENCY MEDICINE

## 2022-10-24 ENCOUNTER — APPOINTMENT (OUTPATIENT)
Dept: GENERAL RADIOLOGY | Facility: HOSPITAL | Age: 57
End: 2022-10-24

## 2022-10-24 VITALS
BODY MASS INDEX: 28 KG/M2 | OXYGEN SATURATION: 96 % | HEIGHT: 64 IN | HEART RATE: 80 BPM | WEIGHT: 164 LBS | DIASTOLIC BLOOD PRESSURE: 69 MMHG | RESPIRATION RATE: 18 BRPM | SYSTOLIC BLOOD PRESSURE: 120 MMHG | TEMPERATURE: 98.5 F

## 2022-10-24 DIAGNOSIS — R07.9 CHEST PAIN, UNSPECIFIED TYPE: Primary | ICD-10-CM

## 2022-10-24 DIAGNOSIS — R06.00 DYSPNEA, UNSPECIFIED TYPE: ICD-10-CM

## 2022-10-24 LAB
ALBUMIN SERPL-MCNC: 4.2 G/DL (ref 3.5–5.2)
ALBUMIN/GLOB SERPL: 1.3 G/DL
ALP SERPL-CCNC: 127 U/L (ref 39–117)
ALT SERPL W P-5'-P-CCNC: 13 U/L (ref 1–33)
ANION GAP SERPL CALCULATED.3IONS-SCNC: 9.6 MMOL/L (ref 5–15)
AST SERPL-CCNC: 17 U/L (ref 1–32)
BASOPHILS # BLD AUTO: 0.06 10*3/MM3 (ref 0–0.2)
BASOPHILS NFR BLD AUTO: 0.5 % (ref 0–1.5)
BILIRUB SERPL-MCNC: 0.3 MG/DL (ref 0–1.2)
BUN SERPL-MCNC: 9 MG/DL (ref 6–20)
BUN/CREAT SERPL: 13 (ref 7–25)
CALCIUM SPEC-SCNC: 8.4 MG/DL (ref 8.6–10.5)
CHLORIDE SERPL-SCNC: 95 MMOL/L (ref 98–107)
CO2 SERPL-SCNC: 27.4 MMOL/L (ref 22–29)
CREAT SERPL-MCNC: 0.69 MG/DL (ref 0.57–1)
DEPRECATED RDW RBC AUTO: 45.1 FL (ref 37–54)
EGFRCR SERPLBLD CKD-EPI 2021: 102 ML/MIN/1.73
EOSINOPHIL # BLD AUTO: 0.05 10*3/MM3 (ref 0–0.4)
EOSINOPHIL NFR BLD AUTO: 0.4 % (ref 0.3–6.2)
ERYTHROCYTE [DISTWIDTH] IN BLOOD BY AUTOMATED COUNT: 15.9 % (ref 12.3–15.4)
GLOBULIN UR ELPH-MCNC: 3.3 GM/DL
GLUCOSE SERPL-MCNC: 100 MG/DL (ref 65–99)
HCT VFR BLD AUTO: 34.6 % (ref 34–46.6)
HGB BLD-MCNC: 10.6 G/DL (ref 12–15.9)
HOLD SPECIMEN: NORMAL
HOLD SPECIMEN: NORMAL
IMM GRANULOCYTES # BLD AUTO: 0.04 10*3/MM3 (ref 0–0.05)
IMM GRANULOCYTES NFR BLD AUTO: 0.4 % (ref 0–0.5)
LYMPHOCYTES # BLD AUTO: 2.04 10*3/MM3 (ref 0.7–3.1)
LYMPHOCYTES NFR BLD AUTO: 18.1 % (ref 19.6–45.3)
MCH RBC QN AUTO: 24.1 PG (ref 26.6–33)
MCHC RBC AUTO-ENTMCNC: 30.6 G/DL (ref 31.5–35.7)
MCV RBC AUTO: 78.8 FL (ref 79–97)
MONOCYTES # BLD AUTO: 0.72 10*3/MM3 (ref 0.1–0.9)
MONOCYTES NFR BLD AUTO: 6.4 % (ref 5–12)
NEUTROPHILS NFR BLD AUTO: 74.2 % (ref 42.7–76)
NEUTROPHILS NFR BLD AUTO: 8.38 10*3/MM3 (ref 1.7–7)
NRBC BLD AUTO-RTO: 0 /100 WBC (ref 0–0.2)
PLATELET # BLD AUTO: 272 10*3/MM3 (ref 140–450)
PMV BLD AUTO: 9.2 FL (ref 6–12)
POTASSIUM SERPL-SCNC: 4.2 MMOL/L (ref 3.5–5.2)
PROT SERPL-MCNC: 7.5 G/DL (ref 6–8.5)
RBC # BLD AUTO: 4.39 10*6/MM3 (ref 3.77–5.28)
SODIUM SERPL-SCNC: 132 MMOL/L (ref 136–145)
TROPONIN T SERPL-MCNC: <0.01 NG/ML (ref 0–0.03)
WBC NRBC COR # BLD: 11.29 10*3/MM3 (ref 3.4–10.8)
WHOLE BLOOD HOLD COAG: NORMAL
WHOLE BLOOD HOLD SPECIMEN: NORMAL

## 2022-10-24 PROCEDURE — 25010000002 MORPHINE PER 10 MG: Performed by: NURSE PRACTITIONER

## 2022-10-24 PROCEDURE — 93005 ELECTROCARDIOGRAM TRACING: CPT

## 2022-10-24 PROCEDURE — 99284 EMERGENCY DEPT VISIT MOD MDM: CPT

## 2022-10-24 PROCEDURE — 25010000002 IOPAMIDOL 61 % SOLUTION: Performed by: EMERGENCY MEDICINE

## 2022-10-24 PROCEDURE — 85025 COMPLETE CBC W/AUTO DIFF WBC: CPT

## 2022-10-24 PROCEDURE — 71045 X-RAY EXAM CHEST 1 VIEW: CPT

## 2022-10-24 PROCEDURE — 80053 COMPREHEN METABOLIC PANEL: CPT

## 2022-10-24 PROCEDURE — 71275 CT ANGIOGRAPHY CHEST: CPT

## 2022-10-24 PROCEDURE — 84484 ASSAY OF TROPONIN QUANT: CPT

## 2022-10-24 PROCEDURE — 96374 THER/PROPH/DIAG INJ IV PUSH: CPT

## 2022-10-24 RX ORDER — SODIUM CHLORIDE 0.9 % (FLUSH) 0.9 %
10 SYRINGE (ML) INJECTION AS NEEDED
Status: DISCONTINUED | OUTPATIENT
Start: 2022-10-24 | End: 2022-10-24 | Stop reason: HOSPADM

## 2022-10-24 RX ORDER — ASPIRIN 325 MG
325 TABLET ORAL ONCE
Status: COMPLETED | OUTPATIENT
Start: 2022-10-24 | End: 2022-10-24

## 2022-10-24 RX ADMIN — ASPIRIN 325 MG ORAL TABLET 325 MG: 325 PILL ORAL at 17:35

## 2022-10-24 RX ADMIN — IOPAMIDOL 100 ML: 612 INJECTION, SOLUTION INTRAVENOUS at 18:31

## 2022-10-24 RX ADMIN — MORPHINE SULFATE 4 MG: 4 INJECTION, SOLUTION INTRAMUSCULAR; INTRAVENOUS at 18:12

## 2022-10-24 RX ADMIN — SODIUM CHLORIDE 1000 ML: 9 INJECTION, SOLUTION INTRAVENOUS at 17:35

## 2022-10-24 NOTE — ED PROVIDER NOTES
Subjective  History of Present Illness:    Chief Complaint: Shortness of breath  History of Present Illness: This is a 57-year-old female patient comes into the ED complaining of chest pain, shortness of breath that is started around 230 this afternoon.  Patient states she is taken multiple doses of sublingual nitroglycerin without relief from her pain.  Patient states it is centered in the left side of her chest does not radiate to her head or neck      Nurses Notes reviewed and agree, including vitals, allergies, social history and prior medical history.       Allergies:    Penicillins, Requip [ropinirole hcl], and Sulfa antibiotics      Past Surgical History:   Procedure Laterality Date   • BREAST LUMPECTOMY Bilateral    • CARDIAC CATHETERIZATION     • CARDIAC ELECTROPHYSIOLOGY PROCEDURE N/A 2017    Procedure: Ablation SVT;  Surgeon: Javier Orozco DO;  Location: [facility] INVASIVE LOCATION;  Service:    • CHOLECYSTECTOMY     • COLONOSCOPY       4 years ago   • CYSTECTOMY      From left wrist   • GASTRIC BYPASS     • HYSTERECTOMY      partial   • OTHER SURGICAL HISTORY      Left ganglion cyst   • OTHER SURGICAL HISTORY      Plate and pin in left radial head   • TUBAL ABDOMINAL LIGATION           Social History     Socioeconomic History   • Marital status:    Tobacco Use   • Smoking status: Former     Packs/day: 1.00     Years: 30.00     Pack years: 30.00     Types: Cigarettes     Quit date: 2009     Years since quittin.2   • Smokeless tobacco: Never   Vaping Use   • Vaping Use: Never used   Substance and Sexual Activity   • Alcohol use: Yes     Comment: occasional   • Drug use: No   • Sexual activity: Defer         Family History   Problem Relation Age of Onset   • Heart attack Mother    • Arthritis Mother    • Hypertension Mother    • Obesity Mother    • Migraines Mother    • Hypertension Father    • Diabetes Father    • Stroke Father    • Obesity Father    • Cancer  Paternal Grandmother         Colon       REVIEW OF SYSTEMS: All systems reviewed and not pertinent unless noted.    Review of Systems   Respiratory: Positive for shortness of breath.    Cardiovascular: Positive for chest pain.       Objective    Physical Exam  Vitals and nursing note reviewed.   Constitutional:       Appearance: Normal appearance.   HENT:      Head: Normocephalic and atraumatic.   Eyes:      Extraocular Movements: Extraocular movements intact.      Pupils: Pupils are equal, round, and reactive to light.   Cardiovascular:      Rate and Rhythm: Normal rate and regular rhythm.      Pulses:           Carotid pulses are 1+ on the right side and 1+ on the left side.       Radial pulses are 1+ on the right side and 1+ on the left side.        Dorsalis pedis pulses are 1+ on the right side and 1+ on the left side.        Posterior tibial pulses are 1+ on the right side and 1+ on the left side.      Heart sounds: Normal heart sounds.   Pulmonary:      Effort: Pulmonary effort is normal.      Breath sounds: Normal breath sounds.      Comments: Decreased in the bases bilaterally  Abdominal:      General: Abdomen is flat. Bowel sounds are normal.      Palpations: Abdomen is soft.   Musculoskeletal:      Cervical back: Normal range of motion and neck supple.   Skin:     Capillary Refill: Capillary refill takes less than 2 seconds.   Neurological:      General: No focal deficit present.      Mental Status: She is alert and oriented to person, place, and time. Mental status is at baseline.      GCS: GCS eye subscore is 4. GCS verbal subscore is 5. GCS motor subscore is 6.      Sensory: Sensation is intact.      Motor: Motor function is intact.      Gait: Gait is intact.   Psychiatric:         Attention and Perception: Attention and perception normal.         Mood and Affect: Mood and affect normal.         Speech: Speech normal.         Behavior: Behavior normal. Behavior is cooperative.           Procedures    ED  Course:    ED Course as of 10/24/22 1915   Mon Oct 24, 2022   1713 EKG interpreted by me: Sinus rhythm, normal rate, no acute ST elevations, some nonspecific T waves, this is an abnormal EKG [MP]      ED Course User Index  [MP] Rah Kirby MD       Lab Results (last 24 hours)     Procedure Component Value Units Date/Time    CBC & Differential [[id number]]  (Abnormal) Collected: 10/24/22 1714    Specimen: Blood Updated: 10/24/22 1721    Narrative:      The following orders were created for panel order CBC & Differential.  Procedure                               Abnormality         Status                     ---------                               -----------         ------                     CBC Auto Differential[[id number]]        Abnormal            Final result                 Please view results for these tests on the individual orders.    Comprehensive Metabolic Panel [[id number]]  (Abnormal) Collected: 10/24/22 1714    Specimen: Blood Updated: 10/24/22 1740     Glucose 100 mg/dL      BUN 9 mg/dL      Creatinine 0.69 mg/dL      Sodium 132 mmol/L      Potassium 4.2 mmol/L      Chloride 95 mmol/L      CO2 27.4 mmol/L      Calcium 8.4 mg/dL      Total Protein 7.5 g/dL      Albumin 4.20 g/dL      ALT (SGPT) 13 U/L      AST (SGOT) 17 U/L      Alkaline Phosphatase 127 U/L      Total Bilirubin 0.3 mg/dL      Globulin 3.3 gm/dL      A/G Ratio 1.3 g/dL      BUN/Creatinine Ratio 13.0     Anion Gap 9.6 mmol/L      eGFR 102.0 mL/min/1.73      Comment: National Kidney Foundation and American Society of Nephrology (ASN) Task Force recommended calculation based on the Chronic Kidney Disease Epidemiology Collaboration (CKD-EPI) equation refit without adjustment for race.       Narrative:      GFR Normal >60  Chronic Kidney Disease <60  Kidney Failure <15      Troponin [[id number]]  (Normal) Collected: 10/24/22 1714    Specimen: Blood Updated: 10/24/22 1743     Troponin T <0.010 ng/mL     Narrative:      Troponin T  Reference Range:  <= 0.03 ng/mL-   Negative for AMI  >0.03 ng/mL-     Abnormal for myocardial necrosis.  Clinicians would have to utilize clinical acumen, EKG, Troponin and serial changes to determine if it is an Acute Myocardial Infarction or myocardial injury due to an underlying chronic condition.       Results may be falsely decreased if patient taking Biotin.      CBC Auto Differential [[id number]]  (Abnormal) Collected: 10/24/22 1714    Specimen: Blood Updated: 10/24/22 1721     WBC 11.29 10*3/mm3      RBC 4.39 10*6/mm3      Hemoglobin 10.6 g/dL      Hematocrit 34.6 %      MCV 78.8 fL      MCH 24.1 pg      MCHC 30.6 g/dL      RDW 15.9 %      RDW-SD 45.1 fl      MPV 9.2 fL      Platelets 272 10*3/mm3      Neutrophil % 74.2 %      Lymphocyte % 18.1 %      Monocyte % 6.4 %      Eosinophil % 0.4 %      Basophil % 0.5 %      Immature Grans % 0.4 %      Neutrophils, Absolute 8.38 10*3/mm3      Lymphocytes, Absolute 2.04 10*3/mm3      Monocytes, Absolute 0.72 10*3/mm3      Eosinophils, Absolute 0.05 10*3/mm3      Basophils, Absolute 0.06 10*3/mm3      Immature Grans, Absolute 0.04 10*3/mm3      nRBC 0.0 /100 WBC            No radiology results from the last 24 hrs       MDM      Final diagnoses:   Chest pain, unspecified type   Dyspnea, unspecified type        Amador Coronel, PK  10/24/22 [id number]

## 2022-10-26 ENCOUNTER — TELEPHONE (OUTPATIENT)
Dept: EMERGENCY DEPT | Facility: HOSPITAL | Age: 57
End: 2022-10-26

## 2022-10-26 NOTE — TELEPHONE ENCOUNTER
Patient's  called back after receiving the message he stated that his wife did receive information about the CT scan specifically she was told that she had a lung mass, she has an appointment with pulmonology in the morning

## 2022-10-26 NOTE — TELEPHONE ENCOUNTER
Message left with patient's  to call the emergency department about recent CT scan report findings, the recommendation is follow-up for the lung nodule, she has seen in the past in 2016 for lung nodules but she has not had a discussion about the recent CT scan that shows a lung nodule concerning for carcinoma

## 2022-11-05 ENCOUNTER — APPOINTMENT (OUTPATIENT)
Dept: GENERAL RADIOLOGY | Facility: HOSPITAL | Age: 57
End: 2022-11-05

## 2022-11-05 ENCOUNTER — HOSPITAL ENCOUNTER (EMERGENCY)
Facility: HOSPITAL | Age: 57
Discharge: HOME | End: 2022-11-05
Attending: EMERGENCY MEDICINE | Admitting: EMERGENCY MEDICINE

## 2022-11-05 VITALS
TEMPERATURE: 98.7 F | BODY MASS INDEX: 26.98 KG/M2 | HEIGHT: 64 IN | SYSTOLIC BLOOD PRESSURE: 167 MMHG | HEART RATE: 81 BPM | OXYGEN SATURATION: 94 % | RESPIRATION RATE: 19 BRPM | WEIGHT: 158 LBS | DIASTOLIC BLOOD PRESSURE: 96 MMHG

## 2022-11-05 DIAGNOSIS — S92.335A CLOSED NONDISPLACED FRACTURE OF THIRD METATARSAL BONE OF LEFT FOOT, INITIAL ENCOUNTER: Primary | ICD-10-CM

## 2022-11-05 DIAGNOSIS — S92.345A CLOSED NONDISPLACED FRACTURE OF FOURTH METATARSAL BONE OF LEFT FOOT, INITIAL ENCOUNTER: ICD-10-CM

## 2022-11-05 PROCEDURE — 99283 EMERGENCY DEPT VISIT LOW MDM: CPT

## 2022-11-05 PROCEDURE — 73630 X-RAY EXAM OF FOOT: CPT

## 2022-11-05 RX ORDER — HYDROCODONE BITARTRATE AND ACETAMINOPHEN 5; 325 MG/1; MG/1
1 TABLET ORAL EVERY 6 HOURS PRN
Qty: 15 TABLET | Refills: 0 | Status: SHIPPED | OUTPATIENT
Start: 2022-11-05 | End: 2022-11-10

## 2022-11-05 RX ORDER — HYDROCODONE BITARTRATE AND ACETAMINOPHEN 5; 325 MG/1; MG/1
1 TABLET ORAL ONCE
Status: COMPLETED | OUTPATIENT
Start: 2022-11-05 | End: 2022-11-05

## 2022-11-05 RX ADMIN — HYDROCODONE BITARTRATE AND ACETAMINOPHEN 1 TABLET: 5; 325 TABLET ORAL at 13:47

## 2022-11-05 NOTE — ED PROVIDER NOTES
Subjective  History of Present Illness:    Chief Complaint: Left foot pain and bruising  History of Present Illness: 57-year-old female presents with a left foot pain and bruising.  She states that she stepped out of bed to go to the bathroom last night and felt like her bones were crushed in her foot.  She states a month ago she dropped a trash can on the foot but has not had an injury since then.  She is unable to bear weight on the foot and has pain and swelling and bruising  Onset: Last night  Duration: Last night  Exacerbating / Alleviating factors: Unable to bear weight, worse with attempting to bear weight, improved with elevating  Associated symptoms: Bruising to the left foot      Nurses Notes reviewed and agree, including vitals, allergies, social history and prior medical history.     REVIEW OF SYSTEMS: All systems reviewed and not pertinent unless noted.    Review of Systems   Musculoskeletal:        Left foot pain and bruising   All other systems reviewed and are negative.      Past Medical History:   Diagnosis Date   • Acid reflux    • Arthritis    • Asthma     HX of   • Back pain    • Chest pain     2/2acute pericarditis ( hx of recurrent episode since late teens) diffuse, non specific EKG, no troponin evaluation to suggest myopericarditis has been treated in the past with prednisone but this may encourage recurrence   • Chicken pox    • COPD (chronic obstructive pulmonary disease) ([facility])    • Dental bridge present    • Hypothyroid    • Measles    • Menopause    • MI, old 2015    Cath @ [facility]/ non-obstructive CAD.     • Morbid obesity ([facility]) 2013    S/P gastric bypass   • Nausea    • Pericarditis 2016   • PSVT (paroxysmal supraventricular tachycardia) ([facility]) 08/2017   • Scarlet fever    • Ulcer    • UTI (urinary tract infection)     2 months ago- denies current s/s   • Wears eyeglasses    • Zari-Parkinson-White (WPW) syndrome        Allergies:    Penicillins, Requip [ropinirole hcl], and Sulfa  "antibiotics      Past Surgical History:   Procedure Laterality Date   • BREAST LUMPECTOMY Bilateral    • CARDIAC CATHETERIZATION     • CARDIAC ELECTROPHYSIOLOGY PROCEDURE N/A 2017    Procedure: Ablation SVT;  Surgeon: Javier Orozco DO;  Location: [facility] INVASIVE LOCATION;  Service:    • CHOLECYSTECTOMY     • COLONOSCOPY       4 years ago   • CYSTECTOMY  1984    From left wrist   • GASTRIC BYPASS     • HYSTERECTOMY      partial   • OTHER SURGICAL HISTORY      Left ganglion cyst   • OTHER SURGICAL HISTORY      Plate and pin in left radial head   • TUBAL ABDOMINAL LIGATION           Social History     Socioeconomic History   • Marital status:    Tobacco Use   • Smoking status: Former     Packs/day: 1.00     Years: 30.00     Pack years: 30.00     Types: Cigarettes     Quit date: 2009     Years since quittin.2   • Smokeless tobacco: Never   Vaping Use   • Vaping Use: Never used   Substance and Sexual Activity   • Alcohol use: Yes     Comment: occasional   • Drug use: No   • Sexual activity: Defer         Family History   Problem Relation Age of Onset   • Heart attack Mother    • Arthritis Mother    • Hypertension Mother    • Obesity Mother    • Migraines Mother    • Hypertension Father    • Diabetes Father    • Stroke Father    • Obesity Father    • Cancer Paternal Grandmother         Colon       Objective  Physical Exam:  /96 (BP Location: Left arm, Patient Position: Sitting)   Pulse 81   Temp 98.7 °F (37.1 °C) (Oral)   Resp 19   Ht 162.6 cm (64\")   Wt 71.7 kg (158 lb)   SpO2 94%   BMI 27.12 kg/m²      Physical Exam  Vitals and nursing note reviewed.   Constitutional:       Appearance: She is well-developed.   HENT:      Head: Normocephalic and atraumatic.   Cardiovascular:      Rate and Rhythm: Normal rate and regular rhythm.   Pulmonary:      Effort: Pulmonary effort is normal.      Breath sounds: Normal breath sounds.   Musculoskeletal:      Cervical back: Normal " range of motion and neck supple.        Feet:       Comments: Tenderness to palpation, bruising to the top of the left foot   Skin:     General: Skin is warm and dry.   Neurological:      Mental Status: She is alert and oriented to person, place, and time.      Deep Tendon Reflexes: Reflexes are normal and symmetric.           Procedures    ED Course:    ED Course as of 11/05/22 1426   Sat Nov 05, 2022   1412 Discussed the case with Dr. Rosas he recommended a low boot and follow-up in Ortho clinic in 1 week [CS]      ED Course User Index  [CS] Kirk Trevizo Jr., JING       Lab Results (last 24 hours)     ** No results found for the last 24 hours. **           XR Foot 3+ View Left    Result Date: 11/5/2022  FINAL REPORT CLINICAL HISTORY: injury, swelling FINDINGS: Three views left foot were obtained.  There are nondisplaced, intra-articular fractures at the base of the 3rd and 4th metatarsals.  There is also a questionable, nondisplaced fracture of the base of the 2nd metatarsal.  The Nata carol joint is at the upper limits of normal in width.  Soft tissue swelling is noted.     Impression: Nondisplaced fractures at the base of the 3rd and 4th metatarsals with questionable nondisplaced fracture at the base of the 2nd metatarsal.    No definite evidence of Nata carol ligament injury. Authenticated and Electronically Signed by Brandy Hernandez MD on 11/05/2022 01:54:38 PM         MDM  Number of Diagnoses or Management Options  Closed nondisplaced fracture of fourth metatarsal bone of left foot, initial encounter: new and requires workup  Closed nondisplaced fracture of third metatarsal bone of left foot, initial encounter: new and requires workup     Amount and/or Complexity of Data Reviewed  Tests in the radiology section of CPT®: reviewed  Discuss the patient with other providers: yes    Risk of Complications, Morbidity, and/or Mortality  Presenting problems: low  Diagnostic procedures: low  Management options:  low  General comments: 57-year-old female with bruising of the left foot after stepping out of bed last night, is tender palpation and swollen, evaluated patient at the bedside, x-rays performed.  Reviewed the radiology report, discussed the case with the patient and family, discussed the case with another provider.  Boot ordered for the left foot and applied, discharged home with instructions as well as pain medications, will follow-up with orthopedic surgery    Patient Progress  Patient progress: stable        Final diagnoses:   Closed nondisplaced fracture of third metatarsal bone of left foot, initial encounter   Closed nondisplaced fracture of fourth metatarsal bone of left foot, initial encounter        Kirk Trevizo Jr., PA-C  11/05/22 3691

## 2022-11-10 ENCOUNTER — OFFICE VISIT (OUTPATIENT)
Dept: ORTHOPEDIC SURGERY | Facility: CLINIC | Age: 57
End: 2022-11-10

## 2022-11-10 VITALS — HEIGHT: 64 IN | WEIGHT: 158 LBS | BODY MASS INDEX: 26.98 KG/M2

## 2022-11-10 DIAGNOSIS — S92.335A CLOSED NONDISPLACED FRACTURE OF THIRD METATARSAL BONE OF LEFT FOOT, INITIAL ENCOUNTER: ICD-10-CM

## 2022-11-10 DIAGNOSIS — S92.345A CLOSED NONDISPLACED FRACTURE OF FOURTH METATARSAL BONE OF LEFT FOOT, INITIAL ENCOUNTER: ICD-10-CM

## 2022-11-10 DIAGNOSIS — S92.325A CLOSED NONDISPLACED FRACTURE OF SECOND METATARSAL BONE OF LEFT FOOT, INITIAL ENCOUNTER: Primary | ICD-10-CM

## 2022-11-10 PROCEDURE — 99203 OFFICE O/P NEW LOW 30 MIN: CPT | Performed by: PHYSICIAN ASSISTANT

## 2022-11-10 RX ORDER — AMITRIPTYLINE HYDROCHLORIDE 25 MG/1
25 TABLET, FILM COATED ORAL DAILY
COMMUNITY

## 2022-11-10 RX ORDER — ONDANSETRON 8 MG/1
TABLET, ORALLY DISINTEGRATING ORAL
COMMUNITY

## 2022-11-10 RX ORDER — IPRATROPIUM BROMIDE AND ALBUTEROL SULFATE 2.5; .5 MG/3ML; MG/3ML
SOLUTION RESPIRATORY (INHALATION)
COMMUNITY

## 2022-11-10 RX ORDER — METHOCARBAMOL 750 MG/1
750 TABLET, FILM COATED ORAL 3 TIMES DAILY
COMMUNITY

## 2022-11-10 RX ORDER — PRAMIPEXOLE DIHYDROCHLORIDE 0.12 MG/1
TABLET ORAL
COMMUNITY

## 2022-11-10 RX ORDER — HYDROCODONE BITARTRATE AND ACETAMINOPHEN 7.5; 325 MG/1; MG/1
TABLET ORAL
COMMUNITY

## 2022-11-10 RX ORDER — ALBUTEROL SULFATE 90 UG/1
AEROSOL, METERED RESPIRATORY (INHALATION)
COMMUNITY

## 2022-11-10 RX ORDER — PANTOPRAZOLE SODIUM 40 MG/1
40 TABLET, DELAYED RELEASE ORAL DAILY
COMMUNITY

## 2022-11-10 RX ORDER — NITROGLYCERIN 0.3 MG/1
0.3 TABLET SUBLINGUAL
COMMUNITY

## 2022-11-10 NOTE — PROGRESS NOTES
"Subjective   Patient ID: Erlinda Steven is a 57 y.o. right hand dominant female  Injury of the Left Foot (States she stood up out of bed and she felt like her foot cracked on 11/4/2022, she also had dropped a wooden trash can on her foot a few weeks before this. Went to the ER the next day, presents in pneumatic boot.)         History of Present Illness  Patient presents as a new patient with complaints of left foot pain.  She states on 11/4/2022, she stood up out of bed and felt increased pain and heard what she describes as \"her bones cracking.  Of note as she mentions that several weeks before this incident she dropped a wood trash lid on her foot but was able to walk on the left foot.  She denies a personal history of osteopenia or osteoporosis.    Pain Score: 4 (8 with the boot on)  Pain Location: Foot  Pain Orientation: Left     Pain Descriptors: Sharp, Shooting, Aching, Throbbing  Pain Frequency: Constant/continuous  Pain Onset: Sudden     Clinical Progression: Not changed  Aggravating Factors: Walking, Standing        Pain Intervention(s): Medication (See MAR), Cold applied  Result of Injury: Yes  Work-Related Injury: No    Past Medical History:   Diagnosis Date   • Acid reflux    • Arthritis    • Asthma     HX of   • Back pain    • Chest pain     2/2acute pericarditis ( hx of recurrent episode since late teens) diffuse, non specific EKG, no troponin evaluation to suggest myopericarditis has been treated in the past with prednisone but this may encourage recurrence   • Chicken pox    • COPD (chronic obstructive pulmonary disease) ([facility])    • Dental bridge present    • Hypothyroid    • Measles    • Menopause    • MI, old 2015    Cath @ [facility]/ non-obstructive CAD.     • Morbid obesity ([facility]) 2013    S/P gastric bypass   • Nausea    • Pericarditis 2016   • PSVT (paroxysmal supraventricular tachycardia) ([facility]) 08/2017   • Scarlet fever    • Ulcer    • UTI (urinary tract infection)     2 months ago- " denies current s/s   • Wears eyeglasses    • Zari-Parkinson-White (WPW) syndrome         Past Surgical History:   Procedure Laterality Date   • BREAST LUMPECTOMY Bilateral 2010   • CARDIAC CATHETERIZATION     • CARDIAC ELECTROPHYSIOLOGY PROCEDURE N/A 2017    Procedure: Ablation SVT;  Surgeon: Javier Orozco DO;  Location: [facility] INVASIVE LOCATION;  Service:    • CHOLECYSTECTOMY     • COLONOSCOPY       4 years ago   • CYSTECTOMY  1984    From left wrist   • GASTRIC BYPASS     • HYSTERECTOMY      partial   • OTHER SURGICAL HISTORY      Left ganglion cyst   • OTHER SURGICAL HISTORY Left 2013    Plate and pin in left radial head unknown surgeon   • TUBAL ABDOMINAL LIGATION         Family History   Problem Relation Age of Onset   • Heart attack Mother    • Arthritis Mother    • Hypertension Mother    • Obesity Mother    • Migraines Mother    • Hypertension Father    • Diabetes Father    • Stroke Father    • Obesity Father    • Cancer Paternal Grandmother         Colon       Social History     Socioeconomic History   • Marital status:    Tobacco Use   • Smoking status: Former     Packs/day: 1.00     Years: 30.00     Pack years: 30.00     Types: Cigarettes     Quit date: 2009     Years since quittin.2   • Smokeless tobacco: Never   Vaping Use   • Vaping Use: Never used   Substance and Sexual Activity   • Alcohol use: Yes     Comment: occasional   • Drug use: No   • Sexual activity: Defer         Current Outpatient Medications:   •  albuterol sulfate  (90 Base) MCG/ACT inhaler, albuterol sulfate HFA 90 mcg/actuation aerosol inhaler  INHALE 2 PUFFS EVERY 4 HOURS BY INHALATION ROUTE AS NEEDED FOR 30 DAYS., Disp: , Rfl:   •  amitriptyline (ELAVIL) 25 MG tablet, Take 1 tablet by mouth Daily., Disp: , Rfl:   •  citalopram (CeleXA) 40 MG tablet, Take 40 mg by mouth Daily., Disp: , Rfl: 0  •  colchicine 0.6 MG capsule capsule, Take 0.6 mg by mouth Daily., Disp: , Rfl: 2  •   "Cyanocobalamin (VITAMIN B-12 IJ), Inject  as directed 1 (One) Time Per Week., Disp: , Rfl:   •  estradiol (ESTRACE) 2 MG tablet, Take 2 mg by mouth Daily., Disp: , Rfl: 1  •  Fluticasone-Umeclidin-Vilant (Trelegy Ellipta) 100-62.5-25 MCG/INH aerosol powder , Inhale 1 puff., Disp: , Rfl:   •  HYDROcodone-acetaminophen (NORCO) 7.5-325 MG per tablet, hydrocodone 7.5 mg-acetaminophen 325 mg tablet, Disp: , Rfl:   •  ipratropium-albuterol (DUO-NEB) 0.5-2.5 mg/3 ml nebulizer, ipratropium 0.5 mg-albuterol 3 mg (2.5 mg base)/3 mL nebulization soln, Disp: , Rfl:   •  levothyroxine (SYNTHROID, LEVOTHROID) 125 MCG tablet, Take 125 mcg by mouth Daily., Disp: , Rfl: 0  •  methocarbamol (ROBAXIN) 500 MG tablet, Take 500 mg by mouth As Needed for Muscle Spasms., Disp: , Rfl: 0  •  methocarbamol (ROBAXIN) 750 MG tablet, Take 1 tablet by mouth 3 (Three) Times a Day., Disp: , Rfl:   •  nitroglycerin (NITROSTAT) 0.3 MG SL tablet, Place 1 tablet under the tongue., Disp: , Rfl:   •  ondansetron ODT (ZOFRAN-ODT) 8 MG disintegrating tablet, ondansetron 8 mg disintegrating tablet  ALLOW ONE TABLET TO DISSOLVE IN THE MOUTH AND SWALLOW EVERY 8 HOURS, Disp: , Rfl:   •  pantoprazole (PROTONIX) 40 MG EC tablet, Take 1 tablet by mouth Daily., Disp: , Rfl:   •  pramipexole (MIRAPEX) 0.125 MG tablet, pramipexole 0.125 mg tablet, Disp: , Rfl:   •  tiZANidine (ZANAFLEX) 4 MG tablet, Take 2 mg by mouth As Needed., Disp: , Rfl: 0  •  vitamin D (ERGOCALCIFEROL) 60140 UNITS capsule capsule, Take 50,000 Units by mouth 2 (Two) Times a Week., Disp: , Rfl: 0    Allergies   Allergen Reactions   • Clarithromycin GI Intolerance   • Doxycycline GI Intolerance   • Penicillins      Nausea and vomiting   • Requip [Ropinirole Hcl] Nausea And Vomiting   • Ropinirole Other (See Comments)     Pt states \"her insides felt like they were on fire\"   • Sulfa Antibiotics      Nausea and vomiting       Review of Systems   Constitutional: Negative for fever.   HENT: Negative " "for dental problem and voice change.    Eyes: Negative for visual disturbance.   Respiratory: Negative for shortness of breath.    Cardiovascular: Negative for chest pain.   Gastrointestinal: Negative for abdominal pain.   Genitourinary: Negative for dysuria.   Musculoskeletal: Positive for arthralgias (left foot) and joint swelling (left foot). Negative for gait problem.   Skin: Positive for color change (left foot bruising ). Negative for rash.   Neurological: Negative for speech difficulty.   Hematological: Does not bruise/bleed easily.   Psychiatric/Behavioral: Negative for confusion.       I have reviewed the medical and surgical history, family history, social history, medications, and/or allergies, and the review of systems of this report.    Objective   Ht 162.6 cm (64.02\")   Wt 71.7 kg (158 lb)   BMI 27.11 kg/m²    Physical Exam  Vitals and nursing note reviewed.   Constitutional:       Appearance: Normal appearance.   Pulmonary:      Effort: Pulmonary effort is normal.   Musculoskeletal:      Left ankle: Normal.      Left Achilles Tendon: Normal.      Left foot: Normal capillary refill. Swelling, tenderness and bony tenderness present. No deformity, foot drop or laceration. Normal pulse.   Neurological:      Mental Status: She is alert and oriented to person, place, and time.       Ortho Exam   Extremity DVT signs are negative on physical exam with negative Norberto sign, no calf pain, no palpable cords and no skin tone change   Neurologic Exam     Mental Status   Oriented to person, place, and time.      + mild bruising to the dorsum of the left foot      Assessment & Plan   Independent Review of Radiographic Studies:    No new imaging done today.  Study Result    Narrative & Impression   FINAL REPORT     CLINICAL HISTORY:  injury, swelling     FINDINGS:  Three views left foot were obtained.  There are nondisplaced,  intra-articular fractures at the base of the 3rd and 4th  metatarsals.  There is also a " questionable, nondisplaced  fracture of the base of the 2nd metatarsal.  The Nata carol joint  is at the upper limits of normal in width.  Soft tissue swelling  is noted.     IMPRESSION:  Nondisplaced fractures at the base of the 3rd and 4th  metatarsals with questionable nondisplaced fracture at the base  of the 2nd metatarsal.    No definite evidence of Nata carol  ligament injury.     Authenticated and Electronically Signed by Brandy Hernandez MD  on 11/05/2022 01:54:38 PM         Procedures       Diagnoses and all orders for this visit:    1. Closed nondisplaced fracture of second metatarsal bone of left foot, initial encounter (Primary)    2. Closed nondisplaced fracture of third metatarsal bone of left foot, initial encounter    3. Closed nondisplaced fracture of fourth metatarsal bone of left foot, initial encounter       Orthopedic activities reviewed and patient expressed appreciation  Discussion of orthopedic goals  Risk, benefits, and merits of treatment alternatives reviewed with the patient and questions answered  Reduced physical activity as appropriate  Weight bearing parameters reviewed  Avoid offending activity  Ice, heat, and/or modalities as beneficial    Recommendations/Plan:  Patient is encouraged to call or return for any issues or concerns.   Because this was a low impact injury and the Lisfranc lines appear within normal limits on the x-ray I do not feel she needs a CT scan of the foot.  I do think she will need a bone density scan in the near future to assess for osteopenia or osteoporosis.  Follow-up in 4 weeks x-ray on arrival.  Continue using the pneumatic boot with a Tubigrip sleeve at all times.  No weightbearing on the left foot without the pneumatic boot.  Patient agreeable to call or return sooner for any concerns.    EMR Dragon-transcription disclaimer:  This encounter note is an electronic transcription of spoken language to printed text.  Electronic transcription of spoken  language may permit erroneous or at times nonsensical words or phrases to be inadvertently transcribed.  Although I have reviewed the note for such errors, some may still exist

## 2022-11-11 ENCOUNTER — PATIENT ROUNDING (BHMG ONLY) (OUTPATIENT)
Dept: ORTHOPEDIC SURGERY | Facility: CLINIC | Age: 57
End: 2022-11-11

## 2022-11-11 NOTE — PROGRESS NOTES
"November 11, 2022    Hello, may I speak with Erlinda Steven?    My name is Aimee      I am  with [facility] [address] [address] [phone].    Before we get started may I verify your date of birth? 1965    I am calling to officially welcome you to our practice and ask about your recent visit. Is this a good time to talk? yes    Tell me about your visit with us. What things went well?  \"Appointment was good. Everything went really well\"       We're always looking for ways to make our patients' experiences even better. Do you have recommendations on ways we may improve?  no    Overall were you satisfied with your first visit to our practice? yes       I appreciate you taking the time to speak with me today. Is there anything else I can do for you? no      Thank you, and have a great day.      "

## 2022-12-07 DIAGNOSIS — S92.325A CLOSED NONDISPLACED FRACTURE OF SECOND METATARSAL BONE OF LEFT FOOT, INITIAL ENCOUNTER: Primary | ICD-10-CM

## 2022-12-07 DIAGNOSIS — S92.345A CLOSED NONDISPLACED FRACTURE OF FOURTH METATARSAL BONE OF LEFT FOOT, INITIAL ENCOUNTER: ICD-10-CM

## 2022-12-07 DIAGNOSIS — S92.335A CLOSED NONDISPLACED FRACTURE OF THIRD METATARSAL BONE OF LEFT FOOT, INITIAL ENCOUNTER: ICD-10-CM

## 2022-12-08 ENCOUNTER — OFFICE VISIT (OUTPATIENT)
Dept: ORTHOPEDIC SURGERY | Facility: CLINIC | Age: 57
End: 2022-12-08

## 2022-12-08 VITALS — BODY MASS INDEX: 26.98 KG/M2 | TEMPERATURE: 98.2 F | HEIGHT: 64 IN | WEIGHT: 158 LBS

## 2022-12-08 DIAGNOSIS — S92.345A CLOSED NONDISPLACED FRACTURE OF FOURTH METATARSAL BONE OF LEFT FOOT, INITIAL ENCOUNTER: ICD-10-CM

## 2022-12-08 DIAGNOSIS — S92.335A CLOSED NONDISPLACED FRACTURE OF THIRD METATARSAL BONE OF LEFT FOOT, INITIAL ENCOUNTER: ICD-10-CM

## 2022-12-08 DIAGNOSIS — S92.325A CLOSED NONDISPLACED FRACTURE OF SECOND METATARSAL BONE OF LEFT FOOT, INITIAL ENCOUNTER: Primary | ICD-10-CM

## 2022-12-08 PROCEDURE — 99213 OFFICE O/P EST LOW 20 MIN: CPT | Performed by: PHYSICIAN ASSISTANT

## 2022-12-08 NOTE — PROGRESS NOTES
Subjective   Patient ID: Erlinda Steven is a 57 y.o. right hand dominant female  Follow-up and Fracture of the Left Foot (Follow up Closed nondisplaced fracture of second metatarsal bone of left foot, initial encounter, Closed nondisplaced fracture of third metatarsal bone of left foot, Closed nondisplaced fracture of fourth metatarsal bone of left foot)         History of Present Illness      Patient is following up for scheduled follow-up visit regarding left foot nondisplaced second metatarsal fracture, third and fourth metatarsal bone fractures.  Date of injury 11/4/2022.  Patient dropped a wooden trash can on her foot a few weeks before she stood up out of bed and felt like her bones cracked.  She has been using a cam boot.  She has noticed improvement in her symptoms.                                                 Past Medical History:   Diagnosis Date   • Acid reflux    • Arthritis    • Asthma     HX of   • Back pain    • Chest pain     2/2acute pericarditis ( hx of recurrent episode since late teens) diffuse, non specific EKG, no troponin evaluation to suggest myopericarditis has been treated in the past with prednisone but this may encourage recurrence   • Chicken pox    • COPD (chronic obstructive pulmonary disease) ([facility])    • Dental bridge present    • Hypothyroid    • Measles    • Menopause    • MI, old 2015    Cath @ [facility]/ non-obstructive CAD.     • Morbid obesity ([facility]) 2013    S/P gastric bypass   • Nausea    • Pericarditis 2016   • PSVT (paroxysmal supraventricular tachycardia) ([facility]) 08/2017   • Scarlet fever    • Ulcer    • UTI (urinary tract infection)     2 months ago- denies current s/s   • Wears eyeglasses    • Zari-Parkinson-White (WPW) syndrome         Past Surgical History:   Procedure Laterality Date   • BREAST LUMPECTOMY Bilateral 2010   • CARDIAC CATHETERIZATION  2015   • CARDIAC ELECTROPHYSIOLOGY PROCEDURE N/A 09/07/2017    Procedure: Ablation SVT;  Surgeon: Javier Orozco  DO;  Location:  [facility];  Service:    • CHOLECYSTECTOMY     • COLONOSCOPY       4 years ago   • CYSTECTOMY  1984    From left wrist   • GASTRIC BYPASS  2013   • HYSTERECTOMY  2010    partial   • OTHER SURGICAL HISTORY      Left ganglion cyst   • OTHER SURGICAL HISTORY Left 2013    Plate and pin in left radial head unknown surgeon   • TUBAL ABDOMINAL LIGATION         Family History   Problem Relation Age of Onset   • Heart attack Mother    • Arthritis Mother    • Hypertension Mother    • Obesity Mother    • Migraines Mother    • Hypertension Father    • Diabetes Father    • Stroke Father    • Obesity Father    • Cancer Paternal Grandmother         Colon       Social History     Socioeconomic History   • Marital status:    Tobacco Use   • Smoking status: Former     Packs/day: 1.00     Years: 30.00     Pack years: 30.00     Types: Cigarettes     Quit date: 2009     Years since quittin.3   • Smokeless tobacco: Never   Vaping Use   • Vaping Use: Never used   Substance and Sexual Activity   • Alcohol use: Yes     Comment: occasional   • Drug use: No   • Sexual activity: Defer         Current Outpatient Medications:   •  albuterol sulfate  (90 Base) MCG/ACT inhaler, albuterol sulfate HFA 90 mcg/actuation aerosol inhaler  INHALE 2 PUFFS EVERY 4 HOURS BY INHALATION ROUTE AS NEEDED FOR 30 DAYS., Disp: , Rfl:   •  amitriptyline (ELAVIL) 25 MG tablet, Take 1 tablet by mouth Daily., Disp: , Rfl:   •  citalopram (CeleXA) 40 MG tablet, Take 40 mg by mouth Daily., Disp: , Rfl: 0  •  colchicine 0.6 MG capsule capsule, Take 0.6 mg by mouth Daily., Disp: , Rfl: 2  •  Cyanocobalamin (VITAMIN B-12 IJ), Inject  as directed 1 (One) Time Per Week., Disp: , Rfl:   •  estradiol (ESTRACE) 2 MG tablet, Take 2 mg by mouth Daily., Disp: , Rfl: 1  •  Fluticasone-Umeclidin-Vilant (Trelegy Ellipta) 100-62.5-25 MCG/INH aerosol powder , Inhale 1 puff., Disp: , Rfl:   •  HYDROcodone-acetaminophen (NORCO)  "7.5-325 MG per tablet, hydrocodone 7.5 mg-acetaminophen 325 mg tablet, Disp: , Rfl:   •  ipratropium-albuterol (DUO-NEB) 0.5-2.5 mg/3 ml nebulizer, ipratropium 0.5 mg-albuterol 3 mg (2.5 mg base)/3 mL nebulization soln, Disp: , Rfl:   •  levothyroxine (SYNTHROID, LEVOTHROID) 125 MCG tablet, Take 125 mcg by mouth Daily., Disp: , Rfl: 0  •  methocarbamol (ROBAXIN) 500 MG tablet, Take 500 mg by mouth As Needed for Muscle Spasms., Disp: , Rfl: 0  •  methocarbamol (ROBAXIN) 750 MG tablet, Take 1 tablet by mouth 3 (Three) Times a Day., Disp: , Rfl:   •  nitroglycerin (NITROSTAT) 0.3 MG SL tablet, Place 1 tablet under the tongue., Disp: , Rfl:   •  ondansetron ODT (ZOFRAN-ODT) 8 MG disintegrating tablet, ondansetron 8 mg disintegrating tablet  ALLOW ONE TABLET TO DISSOLVE IN THE MOUTH AND SWALLOW EVERY 8 HOURS, Disp: , Rfl:   •  pantoprazole (PROTONIX) 40 MG EC tablet, Take 1 tablet by mouth Daily., Disp: , Rfl:   •  pramipexole (MIRAPEX) 0.125 MG tablet, pramipexole 0.125 mg tablet, Disp: , Rfl:   •  tiZANidine (ZANAFLEX) 4 MG tablet, Take 2 mg by mouth As Needed., Disp: , Rfl: 0  •  vitamin D (ERGOCALCIFEROL) 84417 UNITS capsule capsule, Take 50,000 Units by mouth 2 (Two) Times a Week., Disp: , Rfl: 0    Allergies   Allergen Reactions   • Clarithromycin GI Intolerance   • Doxycycline GI Intolerance   • Penicillins      Nausea and vomiting   • Requip [Ropinirole Hcl] Nausea And Vomiting   • Ropinirole Other (See Comments)     Pt states \"her insides felt like they were on fire\"   • Sulfa Antibiotics      Nausea and vomiting       Review of Systems   Constitutional: Negative for fever.   HENT: Negative for dental problem and voice change.    Eyes: Negative for visual disturbance.   Respiratory: Negative for shortness of breath.    Cardiovascular: Negative for chest pain.   Gastrointestinal: Negative for abdominal pain.   Genitourinary: Negative for dysuria.   Musculoskeletal: Positive for arthralgias, gait problem and joint " "swelling.   Skin: Negative for rash.   Neurological: Negative for speech difficulty.   Hematological: Does not bruise/bleed easily.   Psychiatric/Behavioral: Negative for confusion.       I have reviewed the medical and surgical history, family history, social history, medications, and/or allergies, and the review of systems of this report.    Objective   Temp 98.2 °F (36.8 °C)   Ht 162.6 cm (64.02\")   Wt 71.7 kg (158 lb)   BMI 27.10 kg/m²    Physical Exam  Vitals and nursing note reviewed.   Constitutional:       Appearance: Normal appearance.   Pulmonary:      Effort: Pulmonary effort is normal.   Musculoskeletal:      Left ankle: No ecchymosis. No tenderness. Normal range of motion.      Left Achilles Tendon: Normal.      Left foot: Normal range of motion and normal capillary refill. Tenderness and bony tenderness present. No deformity, foot drop, laceration or crepitus. Normal pulse.   Neurological:      Mental Status: She is alert and oriented to person, place, and time.       Ortho Exam   Extremity DVT signs are negative on physical exam with negative Norberto sign, no calf pain, no palpable cords and no skin tone change   Neurologic Exam     Mental Status   Oriented to person, place, and time.              Assessment & Plan   Independent Review of Radiographic Studies:    X-ray of the left foot 3 view performed in the clinic independently reviewed for evaluation of metatarsal fracture healing.  Comparison films are available reviewed.  There is no evidence of plantar projection of the fracture fragments.  There does appear to be slight translation of the third metatarsal base fracture that does appear to slightly widened the Lisfranc ligament      Procedures       Diagnoses and all orders for this visit:    1. Closed nondisplaced fracture of second metatarsal bone of left foot, initial encounter (Primary)  -     MRI Foot Left Without Contrast; Future    2. Closed nondisplaced fracture of third metatarsal bone " of left foot, initial encounter  -     MRI Foot Left Without Contrast; Future    3. Closed nondisplaced fracture of fourth metatarsal bone of left foot, initial encounter  -     MRI Foot Left Without Contrast; Future       Orthopedic activities reviewed and patient expressed appreciation  Discussion of orthopedic goals  Risk, benefits, and merits of treatment alternatives reviewed with the patient and questions answered  Reduced physical activity as appropriate  Weight bearing parameters reviewed  Ice, heat, and/or modalities as beneficial    Recommendations/Plan:  Exercise, medications, injections, other patient advice, and return appointment as noted.  Patient is encouraged to call or return for any issues or concerns.    I would like to order an urgent MRI of the foot to rule out Lisfranc ligament as this would require surgical intervention if there was disruption    Use a CAM boot when weight bearing.   Follow up in 4 weeks XOA    Patient agreeable to call or return sooner for any concerns.      EMR Dragon-transcription disclaimer:  This encounter note is an electronic transcription of spoken language to printed text.  Electronic transcription of spoken language may permit erroneous or at times nonsensical words or phrases to be inadvertently transcribed.  Although I have reviewed the note for such errors, some may still exist

## 2022-12-09 ENCOUNTER — HOSPITAL ENCOUNTER (OUTPATIENT)
Dept: MRI IMAGING | Facility: HOSPITAL | Age: 57
Discharge: HOME | End: 2022-12-09
Admitting: PHYSICIAN ASSISTANT

## 2022-12-09 DIAGNOSIS — S92.345A CLOSED NONDISPLACED FRACTURE OF FOURTH METATARSAL BONE OF LEFT FOOT, INITIAL ENCOUNTER: ICD-10-CM

## 2022-12-09 DIAGNOSIS — S92.325A CLOSED NONDISPLACED FRACTURE OF SECOND METATARSAL BONE OF LEFT FOOT, INITIAL ENCOUNTER: ICD-10-CM

## 2022-12-09 DIAGNOSIS — S92.335A CLOSED NONDISPLACED FRACTURE OF THIRD METATARSAL BONE OF LEFT FOOT, INITIAL ENCOUNTER: ICD-10-CM

## 2022-12-09 PROCEDURE — 73718 MRI LOWER EXTREMITY W/O DYE: CPT

## 2022-12-12 DIAGNOSIS — S92.335A CLOSED NONDISPLACED FRACTURE OF THIRD METATARSAL BONE OF LEFT FOOT, INITIAL ENCOUNTER: ICD-10-CM

## 2022-12-12 DIAGNOSIS — S92.325A CLOSED NONDISPLACED FRACTURE OF SECOND METATARSAL BONE OF LEFT FOOT, INITIAL ENCOUNTER: ICD-10-CM

## 2022-12-12 DIAGNOSIS — S92.345A CLOSED NONDISPLACED FRACTURE OF FOURTH METATARSAL BONE OF LEFT FOOT, INITIAL ENCOUNTER: ICD-10-CM

## 2022-12-12 DIAGNOSIS — S93.622A LISFRANC'S SPRAIN, LEFT, INITIAL ENCOUNTER: Primary | ICD-10-CM

## 2023-01-04 DIAGNOSIS — S93.622A LISFRANC'S SPRAIN, LEFT, INITIAL ENCOUNTER: Primary | ICD-10-CM

## 2023-01-04 DIAGNOSIS — S92.325A CLOSED NONDISPLACED FRACTURE OF SECOND METATARSAL BONE OF LEFT FOOT, INITIAL ENCOUNTER: ICD-10-CM

## 2023-01-04 DIAGNOSIS — S92.335A CLOSED NONDISPLACED FRACTURE OF THIRD METATARSAL BONE OF LEFT FOOT, INITIAL ENCOUNTER: ICD-10-CM

## 2023-01-04 DIAGNOSIS — S92.345A CLOSED NONDISPLACED FRACTURE OF FOURTH METATARSAL BONE OF LEFT FOOT, INITIAL ENCOUNTER: ICD-10-CM

## 2023-01-05 ENCOUNTER — TELEPHONE (OUTPATIENT)
Dept: ORTHOPEDIC SURGERY | Facility: CLINIC | Age: 58
End: 2023-01-05

## 2023-01-05 NOTE — TELEPHONE ENCOUNTER
Caller: Erlinda Steven    Relationship: Self    Best call back number:     What is the best time to reach you: ANY     Who are you requesting to speak with (clinical staff, provider,  specific staff member): CLINICAL    What was the call regarding: PATIENT WANTED TO LET YOU KNOW THAT SHE HAS SEEN WHO SHE WAS REFERRED TO FOR HER FOOT BUT SHE CANT GET SURGERY BECAUSE THE DR HURT HIS KNEE.  SHE IS SICK AND IS CANELLING TODAYS APPT    Do you require a callback: YES

## 2023-02-06 ENCOUNTER — LAB (OUTPATIENT)
Dept: LAB | Facility: HOSPITAL | Age: 58
End: 2023-02-06
Payer: COMMERCIAL

## 2023-02-06 ENCOUNTER — TRANSCRIBE ORDERS (OUTPATIENT)
Dept: LAB | Facility: HOSPITAL | Age: 58
End: 2023-02-06
Payer: COMMERCIAL

## 2023-02-06 DIAGNOSIS — R73.09 ELEVATED GLUCOSE: ICD-10-CM

## 2023-02-06 DIAGNOSIS — Z01.818 OTHER SPECIFIED PRE-OPERATIVE EXAMINATION: ICD-10-CM

## 2023-02-06 DIAGNOSIS — Z87.898 PERSONAL HISTORY OF OTHER LYMPHATIC AND HEMATOPOIETIC NEOPLASM: Primary | ICD-10-CM

## 2023-02-06 DIAGNOSIS — Z87.898 PERSONAL HISTORY OF OTHER LYMPHATIC AND HEMATOPOIETIC NEOPLASM: ICD-10-CM

## 2023-02-06 LAB
DEPRECATED RDW RBC AUTO: 44.5 FL (ref 37–54)
ERYTHROCYTE [DISTWIDTH] IN BLOOD BY AUTOMATED COUNT: 15.8 % (ref 12.3–15.4)
HCT VFR BLD AUTO: 33 % (ref 34–46.6)
HGB BLD-MCNC: 10.1 G/DL (ref 12–15.9)
MCH RBC QN AUTO: 24 PG (ref 26.6–33)
MCHC RBC AUTO-ENTMCNC: 30.6 G/DL (ref 31.5–35.7)
MCV RBC AUTO: 78.6 FL (ref 79–97)
PLATELET # BLD AUTO: 229 10*3/MM3 (ref 140–450)
PMV BLD AUTO: 10.1 FL (ref 6–12)
RBC # BLD AUTO: 4.2 10*6/MM3 (ref 3.77–5.28)
WBC NRBC COR # BLD: 4.66 10*3/MM3 (ref 3.4–10.8)

## 2023-02-06 PROCEDURE — 36415 COLL VENOUS BLD VENIPUNCTURE: CPT

## 2023-02-06 PROCEDURE — 85027 COMPLETE CBC AUTOMATED: CPT

## 2023-04-05 ENCOUNTER — LAB (OUTPATIENT)
Dept: LAB | Facility: HOSPITAL | Age: 58
End: 2023-04-05
Payer: COMMERCIAL

## 2023-04-05 ENCOUNTER — TRANSCRIBE ORDERS (OUTPATIENT)
Dept: LAB | Facility: HOSPITAL | Age: 58
End: 2023-04-05
Payer: COMMERCIAL

## 2023-04-05 DIAGNOSIS — L03.90 CELLULITIS, UNSPECIFIED CELLULITIS SITE: ICD-10-CM

## 2023-04-05 DIAGNOSIS — L03.90 CELLULITIS, UNSPECIFIED CELLULITIS SITE: Primary | ICD-10-CM

## 2023-04-05 LAB
BASOPHILS # BLD AUTO: 0.05 10*3/MM3 (ref 0–0.2)
BASOPHILS NFR BLD AUTO: 1.2 % (ref 0–1.5)
CRP SERPL-MCNC: <0.3 MG/DL (ref 0–0.5)
DEPRECATED RDW RBC AUTO: 44.6 FL (ref 37–54)
EOSINOPHIL # BLD AUTO: 0.18 10*3/MM3 (ref 0–0.4)
EOSINOPHIL NFR BLD AUTO: 4.4 % (ref 0.3–6.2)
ERYTHROCYTE [DISTWIDTH] IN BLOOD BY AUTOMATED COUNT: 15.8 % (ref 12.3–15.4)
ERYTHROCYTE [SEDIMENTATION RATE] IN BLOOD: 7 MM/HR (ref 0–30)
HCT VFR BLD AUTO: 31.9 % (ref 34–46.6)
HGB BLD-MCNC: 9.8 G/DL (ref 12–15.9)
IMM GRANULOCYTES # BLD AUTO: 0 10*3/MM3 (ref 0–0.05)
IMM GRANULOCYTES NFR BLD AUTO: 0 % (ref 0–0.5)
LYMPHOCYTES # BLD AUTO: 1.99 10*3/MM3 (ref 0.7–3.1)
LYMPHOCYTES NFR BLD AUTO: 48.9 % (ref 19.6–45.3)
MCH RBC QN AUTO: 24 PG (ref 26.6–33)
MCHC RBC AUTO-ENTMCNC: 30.7 G/DL (ref 31.5–35.7)
MCV RBC AUTO: 78.2 FL (ref 79–97)
MONOCYTES # BLD AUTO: 0.37 10*3/MM3 (ref 0.1–0.9)
MONOCYTES NFR BLD AUTO: 9.1 % (ref 5–12)
NEUTROPHILS NFR BLD AUTO: 1.48 10*3/MM3 (ref 1.7–7)
NEUTROPHILS NFR BLD AUTO: 36.4 % (ref 42.7–76)
NRBC BLD AUTO-RTO: 0.2 /100 WBC (ref 0–0.2)
PLATELET # BLD AUTO: 232 10*3/MM3 (ref 140–450)
PMV BLD AUTO: 10 FL (ref 6–12)
RBC # BLD AUTO: 4.08 10*6/MM3 (ref 3.77–5.28)
WBC NRBC COR # BLD: 4.07 10*3/MM3 (ref 3.4–10.8)

## 2023-04-05 PROCEDURE — 85652 RBC SED RATE AUTOMATED: CPT

## 2023-04-05 PROCEDURE — 36415 COLL VENOUS BLD VENIPUNCTURE: CPT

## 2023-04-05 PROCEDURE — 85025 COMPLETE CBC W/AUTO DIFF WBC: CPT

## 2023-04-05 PROCEDURE — 86140 C-REACTIVE PROTEIN: CPT
